# Patient Record
Sex: FEMALE | Race: BLACK OR AFRICAN AMERICAN | NOT HISPANIC OR LATINO | Employment: FULL TIME | RURAL
[De-identification: names, ages, dates, MRNs, and addresses within clinical notes are randomized per-mention and may not be internally consistent; named-entity substitution may affect disease eponyms.]

---

## 2019-12-09 ENCOUNTER — HISTORICAL (OUTPATIENT)
Dept: ADMINISTRATIVE | Facility: HOSPITAL | Age: 39
End: 2019-12-09

## 2019-12-11 LAB
LAB AP CLINICAL INFORMATION: NORMAL
LAB AP GENERAL CAT - HISTORICAL: NORMAL
LAB AP INTERPRETATION/RESULT - HISTORICAL: NEGATIVE
LAB AP SPECIMEN ADEQUACY - HISTORICAL: NORMAL
LAB AP SPECIMEN SUBMITTED - HISTORICAL: NORMAL

## 2020-05-04 ENCOUNTER — HISTORICAL (OUTPATIENT)
Dept: ADMINISTRATIVE | Facility: HOSPITAL | Age: 40
End: 2020-05-04

## 2020-05-04 LAB
BASOPHILS # BLD AUTO: 0.03 X10E3/UL (ref 0–0.2)
BASOPHILS NFR BLD AUTO: 0.3 % (ref 0–1)
EOSINOPHIL # BLD AUTO: 0.04 X10E3/UL (ref 0–0.5)
EOSINOPHIL NFR BLD AUTO: 0.4 % (ref 1–4)
ERYTHROCYTE [DISTWIDTH] IN BLOOD BY AUTOMATED COUNT: 13 % (ref 11.5–14.5)
GLUCOSE SERPL-MCNC: 124 MG/DL (ref 74–106)
HCT VFR BLD AUTO: 33.5 % (ref 38–47)
HGB BLD-MCNC: 10.9 G/DL (ref 12–16)
IMM GRANULOCYTES # BLD AUTO: 0.07 X10E3/UL (ref 0–0.04)
IMM GRANULOCYTES NFR BLD: 0.7 % (ref 0–0.4)
LYMPHOCYTES # BLD AUTO: 1.52 X10E3/UL (ref 1–4.8)
LYMPHOCYTES NFR BLD AUTO: 14.3 % (ref 27–41)
MCH RBC QN AUTO: 28 PG (ref 27–31)
MCHC RBC AUTO-ENTMCNC: 32.5 G/DL (ref 32–36)
MCV RBC AUTO: 86.1 FL (ref 80–96)
MONOCYTES # BLD AUTO: 0.72 X10E3/UL (ref 0–0.8)
MONOCYTES NFR BLD AUTO: 6.8 % (ref 2–6)
MPC BLD CALC-MCNC: 10.2 FL (ref 9.4–12.4)
NEUTROPHILS # BLD AUTO: 8.27 X10E3/UL (ref 1.8–7.7)
NEUTROPHILS NFR BLD AUTO: 77.5 % (ref 53–65)
NRBC # BLD AUTO: 0 X10E3/UL (ref 0–0)
NRBC, AUTO (.00): 0 /100 (ref 0–0)
PLATELET # BLD AUTO: 310 X10E3/UL (ref 150–400)
RBC # BLD AUTO: 3.89 X10E6/UL (ref 4.2–5.4)
WBC # BLD AUTO: 10.65 X10E3/UL (ref 4.5–11)

## 2020-06-29 ENCOUNTER — HISTORICAL (OUTPATIENT)
Dept: ADMINISTRATIVE | Facility: HOSPITAL | Age: 40
End: 2020-06-29

## 2020-06-29 LAB
BASOPHILS # BLD AUTO: 0.03 X10E3/UL (ref 0–0.2)
BASOPHILS NFR BLD AUTO: 0.3 % (ref 0–1)
CANDIDA SPECIES: NEGATIVE
EOSINOPHIL # BLD AUTO: 0.04 X10E3/UL (ref 0–0.5)
EOSINOPHIL NFR BLD AUTO: 0.5 % (ref 1–4)
ERYTHROCYTE [DISTWIDTH] IN BLOOD BY AUTOMATED COUNT: 13.2 % (ref 11.5–14.5)
GARDNERELLA: NEGATIVE
HCT VFR BLD AUTO: 33.4 % (ref 38–47)
HGB BLD-MCNC: 11.1 G/DL (ref 12–16)
IMM GRANULOCYTES # BLD AUTO: 0.05 X10E3/UL (ref 0–0.04)
IMM GRANULOCYTES NFR BLD: 0.6 % (ref 0–0.4)
LYMPHOCYTES # BLD AUTO: 1.61 X10E3/UL (ref 1–4.8)
LYMPHOCYTES NFR BLD AUTO: 18.6 % (ref 27–41)
MCH RBC QN AUTO: 28.1 PG (ref 27–31)
MCHC RBC AUTO-ENTMCNC: 33.2 G/DL (ref 32–36)
MCV RBC AUTO: 84.6 FL (ref 80–96)
MONOCYTES # BLD AUTO: 0.68 X10E3/UL (ref 0–0.8)
MONOCYTES NFR BLD AUTO: 7.9 % (ref 2–6)
MPC BLD CALC-MCNC: 10.2 FL (ref 9.4–12.4)
NEUTROPHILS # BLD AUTO: 6.25 X10E3/UL (ref 1.8–7.7)
NEUTROPHILS NFR BLD AUTO: 72.1 % (ref 53–65)
NRBC # BLD AUTO: 0 X10E3/UL (ref 0–0)
NRBC, AUTO (.00): 0 /100 (ref 0–0)
PLATELET # BLD AUTO: 259 X10E3/UL (ref 150–400)
RBC # BLD AUTO: 3.95 X10E6/UL (ref 4.2–5.4)
SYPHILIS AB INTERPRETATION: NORMAL
TRICHOMONAS: NEGATIVE
WBC # BLD AUTO: 8.66 X10E3/UL (ref 4.5–11)

## 2020-06-30 LAB
CHLAMYDIA BY PCR: NEGATIVE
GROUP B STREP, PCR: POSITIVE
N. GONORRHOEAE (GC) BY PCR: NEGATIVE

## 2020-07-12 ENCOUNTER — HISTORICAL (OUTPATIENT)
Dept: ADMINISTRATIVE | Facility: HOSPITAL | Age: 40
End: 2020-07-12

## 2020-07-12 LAB
BILIRUB UR QL STRIP: NEGATIVE MG/DL
CLARITY UR: CLEAR
COLOR UR: YELLOW
GLUCOSE UR STRIP-MCNC: NEGATIVE MG/DL
KETONES UR STRIP-SCNC: ABNORMAL MG/DL
LEUKOCYTE ESTERASE UR QL STRIP: NEGATIVE LEU/UL
NITRITE UR QL STRIP: NEGATIVE
PH UR STRIP: 6 PH UNITS (ref 5–8)
PROT UR QL STRIP: 100 MG/DL
RBC # UR STRIP: ABNORMAL ERY/UL
SP GR UR STRIP: >=1.03 (ref 1–1.03)
UROBILINOGEN UR STRIP-ACNC: 1 EU/DL

## 2020-07-13 LAB
ABO: NORMAL
ANTIBODY SCREEN: NORMAL
BACTERIA #/AREA URNS HPF: ABNORMAL /HPF
BASOPHILS # BLD AUTO: 0.02 X10E3/UL (ref 0–0.2)
BASOPHILS NFR BLD AUTO: 0.2 % (ref 0–1)
CLARITY UR: CLEAR
COLOR UR: YELLOW
EOSINOPHIL # BLD AUTO: 0.04 X10E3/UL (ref 0–0.5)
EOSINOPHIL NFR BLD AUTO: 0.4 % (ref 1–4)
ERYTHROCYTE [DISTWIDTH] IN BLOOD BY AUTOMATED COUNT: 13.1 % (ref 11.5–14.5)
HCT VFR BLD AUTO: 32.3 % (ref 38–47)
HGB BLD-MCNC: 10.6 G/DL (ref 12–16)
IMM GRANULOCYTES # BLD AUTO: 0.06 X10E3/UL (ref 0–0.04)
IMM GRANULOCYTES NFR BLD: 0.6 % (ref 0–0.4)
LYMPHOCYTES # BLD AUTO: 1.57 X10E3/UL (ref 1–4.8)
LYMPHOCYTES NFR BLD AUTO: 15.7 % (ref 27–41)
MCH RBC QN AUTO: 28 PG (ref 27–31)
MCHC RBC AUTO-ENTMCNC: 32.8 G/DL (ref 32–36)
MCV RBC AUTO: 85.4 FL (ref 80–96)
MONOCYTES # BLD AUTO: 0.74 X10E3/UL (ref 0–0.8)
MONOCYTES NFR BLD AUTO: 7.4 % (ref 2–6)
MPC BLD CALC-MCNC: 9.8 FL (ref 9.4–12.4)
MUCOUS THREADS #/AREA URNS HPF: ABNORMAL /HPF
NEUTROPHILS # BLD AUTO: 7.6 X10E3/UL (ref 1.8–7.7)
NEUTROPHILS NFR BLD AUTO: 75.7 % (ref 53–65)
NRBC # BLD AUTO: 0 X10E3/UL (ref 0–0)
NRBC, AUTO (.00): 0 /100 (ref 0–0)
PLATELET # BLD AUTO: 244 X10E3/UL (ref 150–400)
RBC # BLD AUTO: 3.78 X10E6/UL (ref 4.2–5.4)
RBC #/AREA URNS HPF: ABNORMAL /HPF (ref 0–3)
RH TYPE: NORMAL
SQUAMOUS #/AREA URNS LPF: ABNORMAL /LPF
SYPHILIS AB INTERPRETATION: NORMAL
TRICHOMONAS #/AREA URNS HPF: ABNORMAL /HPF
WBC # BLD AUTO: 10.03 X10E3/UL (ref 4.5–11)
WBC #/AREA URNS HPF: ABNORMAL /HPF (ref 0–5)
YEAST #/AREA URNS HPF: ABNORMAL /HPF

## 2020-07-14 ENCOUNTER — HISTORICAL (OUTPATIENT)
Dept: ADMINISTRATIVE | Facility: HOSPITAL | Age: 40
End: 2020-07-14

## 2020-07-14 LAB
BASOPHILS # BLD AUTO: 0.04 X10E3/UL (ref 0–0.2)
BASOPHILS NFR BLD AUTO: 0.3 % (ref 0–1)
EOSINOPHIL # BLD AUTO: 0.07 X10E3/UL (ref 0–0.5)
EOSINOPHIL NFR BLD AUTO: 0.5 % (ref 1–4)
ERYTHROCYTE [DISTWIDTH] IN BLOOD BY AUTOMATED COUNT: 13.3 % (ref 11.5–14.5)
HCT VFR BLD AUTO: 25.4 % (ref 38–47)
HGB BLD-MCNC: 8.3 G/DL (ref 12–16)
IMM GRANULOCYTES # BLD AUTO: 0.11 X10E3/UL (ref 0–0.04)
IMM GRANULOCYTES NFR BLD: 0.7 % (ref 0–0.4)
LYMPHOCYTES # BLD AUTO: 1.88 X10E3/UL (ref 1–4.8)
LYMPHOCYTES NFR BLD AUTO: 12.2 % (ref 27–41)
MCH RBC QN AUTO: 28.1 PG (ref 27–31)
MCHC RBC AUTO-ENTMCNC: 32.7 G/DL (ref 32–36)
MCV RBC AUTO: 86.1 FL (ref 80–96)
MONOCYTES # BLD AUTO: 1.08 X10E3/UL (ref 0–0.8)
MONOCYTES NFR BLD AUTO: 7 % (ref 2–6)
MPC BLD CALC-MCNC: 9.6 FL (ref 9.4–12.4)
NEUTROPHILS # BLD AUTO: 12.19 X10E3/UL (ref 1.8–7.7)
NEUTROPHILS NFR BLD AUTO: 79.3 % (ref 53–65)
NRBC # BLD AUTO: 0 X10E3/UL (ref 0–0)
NRBC, AUTO (.00): 0 /100 (ref 0–0)
PLATELET # BLD AUTO: 202 X10E3/UL (ref 150–400)
RBC # BLD AUTO: 2.95 X10E6/UL (ref 4.2–5.4)
WBC # BLD AUTO: 15.37 X10E3/UL (ref 4.5–11)

## 2020-09-01 ENCOUNTER — HISTORICAL (OUTPATIENT)
Dept: ADMINISTRATIVE | Facility: HOSPITAL | Age: 40
End: 2020-09-01

## 2020-09-03 LAB
LAB AP DIAGNOSIS - HISTORICAL: NORMAL
LAB AP GROSS PATHOLOGY - HISTORICAL: NORMAL
LAB AP SPECIMEN SUBMITTED - HISTORICAL: NORMAL

## 2021-02-22 ENCOUNTER — HISTORICAL (OUTPATIENT)
Dept: ADMINISTRATIVE | Facility: HOSPITAL | Age: 41
End: 2021-02-22

## 2021-03-02 LAB
INSULIN SERPL-ACNC: NORMAL U[IU]/ML

## 2021-07-15 ENCOUNTER — OFFICE VISIT (OUTPATIENT)
Dept: FAMILY MEDICINE | Facility: CLINIC | Age: 41
End: 2021-07-15
Payer: COMMERCIAL

## 2021-07-15 VITALS
RESPIRATION RATE: 20 BRPM | HEART RATE: 68 BPM | HEIGHT: 64 IN | DIASTOLIC BLOOD PRESSURE: 92 MMHG | TEMPERATURE: 98 F | OXYGEN SATURATION: 100 % | SYSTOLIC BLOOD PRESSURE: 140 MMHG

## 2021-07-15 DIAGNOSIS — H66.90 OTITIS MEDIA, UNSPECIFIED LATERALITY, UNSPECIFIED OTITIS MEDIA TYPE: ICD-10-CM

## 2021-07-15 DIAGNOSIS — Z20.828 EXPOSURE TO SARS-ASSOCIATED CORONAVIRUS: Primary | ICD-10-CM

## 2021-07-15 DIAGNOSIS — J01.00 ACUTE NON-RECURRENT MAXILLARY SINUSITIS: ICD-10-CM

## 2021-07-15 LAB
CTP QC/QA: YES
FLUAV AG NPH QL: NEGATIVE
FLUBV AG NPH QL: NEGATIVE
SARS-COV-2 AG RESP QL IA.RAPID: NEGATIVE

## 2021-07-15 PROCEDURE — 96372 THER/PROPH/DIAG INJ SC/IM: CPT | Mod: ,,, | Performed by: NURSE PRACTITIONER

## 2021-07-15 PROCEDURE — 87428 POCT SARS-COV2 (COVID) WITH FLU ANTIGEN: ICD-10-PCS | Mod: QW,,, | Performed by: NURSE PRACTITIONER

## 2021-07-15 PROCEDURE — 99213 OFFICE O/P EST LOW 20 MIN: CPT | Mod: 25,,, | Performed by: NURSE PRACTITIONER

## 2021-07-15 PROCEDURE — 99213 PR OFFICE/OUTPT VISIT, EST, LEVL III, 20-29 MIN: ICD-10-PCS | Mod: 25,,, | Performed by: NURSE PRACTITIONER

## 2021-07-15 PROCEDURE — 87428 SARSCOV & INF VIR A&B AG IA: CPT | Mod: QW,,, | Performed by: NURSE PRACTITIONER

## 2021-07-15 PROCEDURE — 96372 PR INJECTION,THERAP/PROPH/DIAG2ST, IM OR SUBCUT: ICD-10-PCS | Mod: ,,, | Performed by: NURSE PRACTITIONER

## 2021-07-15 RX ORDER — DEXAMETHASONE SODIUM PHOSPHATE 4 MG/ML
6 INJECTION, SOLUTION INTRA-ARTICULAR; INTRALESIONAL; INTRAMUSCULAR; INTRAVENOUS; SOFT TISSUE ONCE
Status: COMPLETED | OUTPATIENT
Start: 2021-07-15 | End: 2021-07-15

## 2021-07-15 RX ORDER — AMLODIPINE BESYLATE 5 MG/1
5 TABLET ORAL DAILY
COMMUNITY
Start: 2021-06-04 | End: 2021-10-11 | Stop reason: SDUPTHER

## 2021-07-15 RX ORDER — LABETALOL 300 MG/1
300 TABLET, FILM COATED ORAL 2 TIMES DAILY
COMMUNITY
Start: 2021-05-23 | End: 2021-09-10 | Stop reason: ALTCHOICE

## 2021-07-15 RX ORDER — AMOXICILLIN AND CLAVULANATE POTASSIUM 875; 125 MG/1; MG/1
1 TABLET, FILM COATED ORAL 2 TIMES DAILY
Qty: 20 TABLET | Refills: 0 | Status: SHIPPED | OUTPATIENT
Start: 2021-07-15 | End: 2021-07-25

## 2021-07-15 RX ORDER — ACETAMINOPHEN AND CODEINE PHOSPHATE 120; 12 MG/5ML; MG/5ML
1 SOLUTION ORAL DAILY
COMMUNITY
Start: 2021-01-25 | End: 2022-03-01 | Stop reason: ALTCHOICE

## 2021-07-15 RX ORDER — VENLAFAXINE HYDROCHLORIDE 37.5 MG/1
37.5 CAPSULE, EXTENDED RELEASE ORAL DAILY
COMMUNITY
Start: 2021-06-04 | End: 2023-04-18

## 2021-07-15 RX ORDER — MECLIZINE HCL 12.5 MG 12.5 MG/1
12.5 TABLET ORAL 3 TIMES DAILY PRN
Qty: 30 TABLET | Refills: 0 | Status: SHIPPED | OUTPATIENT
Start: 2021-07-15 | End: 2021-11-05

## 2021-07-15 RX ADMIN — DEXAMETHASONE SODIUM PHOSPHATE 6 MG: 4 INJECTION, SOLUTION INTRA-ARTICULAR; INTRALESIONAL; INTRAMUSCULAR; INTRAVENOUS; SOFT TISSUE at 12:07

## 2021-07-29 ENCOUNTER — HOSPITAL ENCOUNTER (OUTPATIENT)
Dept: RADIOLOGY | Facility: HOSPITAL | Age: 41
Discharge: HOME | End: 2021-07-29
Attending: NURSE PRACTITIONER
Payer: COMMERCIAL

## 2021-07-29 ENCOUNTER — OFFICE VISIT (OUTPATIENT)
Dept: PRIMARY CARE CLINIC | Facility: CLINIC | Age: 41
End: 2021-07-29
Payer: COMMERCIAL

## 2021-07-29 VITALS
RESPIRATION RATE: 18 BRPM | BODY MASS INDEX: 31.07 KG/M2 | DIASTOLIC BLOOD PRESSURE: 69 MMHG | WEIGHT: 182 LBS | HEIGHT: 64 IN | SYSTOLIC BLOOD PRESSURE: 138 MMHG | TEMPERATURE: 99 F | HEART RATE: 104 BPM | OXYGEN SATURATION: 97 %

## 2021-07-29 DIAGNOSIS — S80.02XA CONTUSION OF LEFT KNEE, INITIAL ENCOUNTER: ICD-10-CM

## 2021-07-29 DIAGNOSIS — S40.012A CONTUSION OF LEFT SHOULDER, INITIAL ENCOUNTER: ICD-10-CM

## 2021-07-29 DIAGNOSIS — S80.02XA CONTUSION OF LEFT KNEE, INITIAL ENCOUNTER: Primary | ICD-10-CM

## 2021-07-29 PROCEDURE — 73562 X-RAY EXAM OF KNEE 3: CPT | Mod: TC,LT

## 2021-07-29 PROCEDURE — 99213 OFFICE O/P EST LOW 20 MIN: CPT | Mod: ,,, | Performed by: NURSE PRACTITIONER

## 2021-07-29 PROCEDURE — 73562 XR KNEE AP LAT WITH SUNRISE LEFT: ICD-10-PCS | Mod: 26,LT,, | Performed by: RADIOLOGY

## 2021-07-29 PROCEDURE — 73030 X-RAY EXAM OF SHOULDER: CPT | Mod: 26,LT,, | Performed by: RADIOLOGY

## 2021-07-29 PROCEDURE — 99213 PR OFFICE/OUTPT VISIT, EST, LEVL III, 20-29 MIN: ICD-10-PCS | Mod: ,,, | Performed by: NURSE PRACTITIONER

## 2021-07-29 PROCEDURE — 73030 X-RAY EXAM OF SHOULDER: CPT | Mod: TC,LT

## 2021-07-29 PROCEDURE — 73562 X-RAY EXAM OF KNEE 3: CPT | Mod: 26,LT,, | Performed by: RADIOLOGY

## 2021-07-29 PROCEDURE — 73030 XR SHOULDER COMPLETE 2 OR MORE VIEWS LEFT: ICD-10-PCS | Mod: 26,LT,, | Performed by: RADIOLOGY

## 2021-08-03 ENCOUNTER — OFFICE VISIT (OUTPATIENT)
Dept: FAMILY MEDICINE | Facility: CLINIC | Age: 41
End: 2021-08-03
Payer: COMMERCIAL

## 2021-08-03 DIAGNOSIS — Z11.52 ENCOUNTER FOR SCREENING FOR COVID-19: Primary | ICD-10-CM

## 2021-08-03 PROCEDURE — 99212 PR OFFICE/OUTPT VISIT, EST, LEVL II, 10-19 MIN: ICD-10-PCS | Mod: GC,,, | Performed by: FAMILY MEDICINE

## 2021-08-03 PROCEDURE — 87635 SARS-COV-2 COVID-19 AMP PRB: CPT | Mod: ,,, | Performed by: CLINICAL MEDICAL LABORATORY

## 2021-08-03 PROCEDURE — 99212 OFFICE O/P EST SF 10 MIN: CPT | Mod: GC,,, | Performed by: FAMILY MEDICINE

## 2021-08-03 PROCEDURE — 87635 SARS-COV-2 (COVID-19) QUALITATIVE PCR: ICD-10-PCS | Mod: ,,, | Performed by: CLINICAL MEDICAL LABORATORY

## 2021-08-05 LAB — SARS-COV-2 RNA RESP QL NAA+PROBE: NEGATIVE

## 2021-09-10 ENCOUNTER — OFFICE VISIT (OUTPATIENT)
Dept: FAMILY MEDICINE | Facility: CLINIC | Age: 41
End: 2021-09-10
Payer: COMMERCIAL

## 2021-09-10 VITALS
HEART RATE: 76 BPM | SYSTOLIC BLOOD PRESSURE: 140 MMHG | TEMPERATURE: 97 F | RESPIRATION RATE: 16 BRPM | BODY MASS INDEX: 31.24 KG/M2 | DIASTOLIC BLOOD PRESSURE: 80 MMHG | HEIGHT: 64 IN | WEIGHT: 183 LBS

## 2021-09-10 DIAGNOSIS — I10 ESSENTIAL HYPERTENSION: ICD-10-CM

## 2021-09-10 PROCEDURE — 3077F SYST BP >= 140 MM HG: CPT | Mod: ,,, | Performed by: FAMILY MEDICINE

## 2021-09-10 PROCEDURE — 99213 PR OFFICE/OUTPT VISIT, EST, LEVL III, 20-29 MIN: ICD-10-PCS | Mod: ,,, | Performed by: FAMILY MEDICINE

## 2021-09-10 PROCEDURE — 1159F MED LIST DOCD IN RCRD: CPT | Mod: ,,, | Performed by: FAMILY MEDICINE

## 2021-09-10 PROCEDURE — 3077F PR MOST RECENT SYSTOLIC BLOOD PRESSURE >= 140 MM HG: ICD-10-PCS | Mod: ,,, | Performed by: FAMILY MEDICINE

## 2021-09-10 PROCEDURE — 3008F BODY MASS INDEX DOCD: CPT | Mod: ,,, | Performed by: FAMILY MEDICINE

## 2021-09-10 PROCEDURE — 3079F DIAST BP 80-89 MM HG: CPT | Mod: ,,, | Performed by: FAMILY MEDICINE

## 2021-09-10 PROCEDURE — 1159F PR MEDICATION LIST DOCUMENTED IN MEDICAL RECORD: ICD-10-PCS | Mod: ,,, | Performed by: FAMILY MEDICINE

## 2021-09-10 PROCEDURE — 3008F PR BODY MASS INDEX (BMI) DOCUMENTED: ICD-10-PCS | Mod: ,,, | Performed by: FAMILY MEDICINE

## 2021-09-10 PROCEDURE — 99213 OFFICE O/P EST LOW 20 MIN: CPT | Mod: ,,, | Performed by: FAMILY MEDICINE

## 2021-09-10 PROCEDURE — 3079F PR MOST RECENT DIASTOLIC BLOOD PRESSURE 80-89 MM HG: ICD-10-PCS | Mod: ,,, | Performed by: FAMILY MEDICINE

## 2021-10-11 ENCOUNTER — OFFICE VISIT (OUTPATIENT)
Dept: FAMILY MEDICINE | Facility: CLINIC | Age: 41
End: 2021-10-11
Payer: COMMERCIAL

## 2021-10-11 VITALS
BODY MASS INDEX: 31.76 KG/M2 | SYSTOLIC BLOOD PRESSURE: 140 MMHG | DIASTOLIC BLOOD PRESSURE: 92 MMHG | RESPIRATION RATE: 16 BRPM | HEART RATE: 88 BPM | WEIGHT: 186 LBS | HEIGHT: 64 IN | TEMPERATURE: 97 F

## 2021-10-11 DIAGNOSIS — I10 ESSENTIAL HYPERTENSION: Primary | ICD-10-CM

## 2021-10-11 DIAGNOSIS — R41.0 CONFUSION: ICD-10-CM

## 2021-10-11 LAB
ALBUMIN SERPL BCP-MCNC: 3.8 G/DL (ref 3.5–5)
ALBUMIN/GLOB SERPL: 1.1 {RATIO}
ALP SERPL-CCNC: 63 U/L (ref 37–98)
ALT SERPL W P-5'-P-CCNC: 18 U/L (ref 13–56)
ANION GAP SERPL CALCULATED.3IONS-SCNC: 9 MMOL/L (ref 7–16)
AST SERPL W P-5'-P-CCNC: 15 U/L (ref 15–37)
BASOPHILS # BLD AUTO: 0.03 K/UL (ref 0–0.2)
BASOPHILS NFR BLD AUTO: 0.4 % (ref 0–1)
BILIRUB SERPL-MCNC: 1.1 MG/DL (ref 0–1.2)
BUN SERPL-MCNC: 15 MG/DL (ref 7–18)
BUN/CREAT SERPL: 15 (ref 6–20)
CALCIUM SERPL-MCNC: 9.4 MG/DL (ref 8.5–10.1)
CHLORIDE SERPL-SCNC: 107 MMOL/L (ref 98–107)
CO2 SERPL-SCNC: 28 MMOL/L (ref 21–32)
CREAT SERPL-MCNC: 0.98 MG/DL (ref 0.55–1.02)
DIFFERENTIAL METHOD BLD: ABNORMAL
EOSINOPHIL # BLD AUTO: 0.09 K/UL (ref 0–0.5)
EOSINOPHIL NFR BLD AUTO: 1.3 % (ref 1–4)
ERYTHROCYTE [DISTWIDTH] IN BLOOD BY AUTOMATED COUNT: 12.8 % (ref 11.5–14.5)
GLOBULIN SER-MCNC: 3.4 G/DL (ref 2–4)
GLUCOSE SERPL-MCNC: 106 MG/DL (ref 74–106)
HCT VFR BLD AUTO: 42.7 % (ref 38–47)
HGB BLD-MCNC: 14 G/DL (ref 12–16)
IMM GRANULOCYTES # BLD AUTO: 0.03 K/UL (ref 0–0.04)
IMM GRANULOCYTES NFR BLD: 0.4 % (ref 0–0.4)
IRON SATN MFR SERPL: 39 % (ref 14–50)
IRON SERPL-MCNC: 114 ΜG/DL (ref 50–170)
LYMPHOCYTES # BLD AUTO: 1.98 K/UL (ref 1–4.8)
LYMPHOCYTES NFR BLD AUTO: 27.5 % (ref 27–41)
MCH RBC QN AUTO: 27.3 PG (ref 27–31)
MCHC RBC AUTO-ENTMCNC: 32.8 G/DL (ref 32–36)
MCV RBC AUTO: 83.4 FL (ref 80–96)
MONOCYTES # BLD AUTO: 0.71 K/UL (ref 0–0.8)
MONOCYTES NFR BLD AUTO: 9.9 % (ref 2–6)
MPC BLD CALC-MCNC: 9.7 FL (ref 9.4–12.4)
NEUTROPHILS # BLD AUTO: 4.35 K/UL (ref 1.8–7.7)
NEUTROPHILS NFR BLD AUTO: 60.5 % (ref 53–65)
NRBC # BLD AUTO: 0 X10E3/UL
NRBC, AUTO (.00): 0 %
PLATELET # BLD AUTO: 378 K/UL (ref 150–400)
POTASSIUM SERPL-SCNC: 4.1 MMOL/L (ref 3.5–5.1)
PROT SERPL-MCNC: 7.2 G/DL (ref 6.4–8.2)
RBC # BLD AUTO: 5.12 M/UL (ref 4.2–5.4)
SODIUM SERPL-SCNC: 140 MMOL/L (ref 136–145)
T4 FREE SERPL-MCNC: 1.15 NG/DL (ref 0.76–1.46)
TIBC SERPL-MCNC: 291 ΜG/DL (ref 250–450)
TSH SERPL DL<=0.005 MIU/L-ACNC: 0.45 UIU/ML (ref 0.36–3.74)
WBC # BLD AUTO: 7.19 K/UL (ref 4.5–11)

## 2021-10-11 PROCEDURE — 3080F PR MOST RECENT DIASTOLIC BLOOD PRESSURE >= 90 MM HG: ICD-10-PCS | Mod: ,,, | Performed by: FAMILY MEDICINE

## 2021-10-11 PROCEDURE — 80050 PR  GENERAL HEALTH PANEL: ICD-10-PCS | Mod: ,,, | Performed by: CLINICAL MEDICAL LABORATORY

## 2021-10-11 PROCEDURE — 1159F MED LIST DOCD IN RCRD: CPT | Mod: ,,, | Performed by: FAMILY MEDICINE

## 2021-10-11 PROCEDURE — 3080F DIAST BP >= 90 MM HG: CPT | Mod: ,,, | Performed by: FAMILY MEDICINE

## 2021-10-11 PROCEDURE — 99214 OFFICE O/P EST MOD 30 MIN: CPT | Mod: ,,, | Performed by: FAMILY MEDICINE

## 2021-10-11 PROCEDURE — 99214 PR OFFICE/OUTPT VISIT, EST, LEVL IV, 30-39 MIN: ICD-10-PCS | Mod: ,,, | Performed by: FAMILY MEDICINE

## 2021-10-11 PROCEDURE — 3077F SYST BP >= 140 MM HG: CPT | Mod: ,,, | Performed by: FAMILY MEDICINE

## 2021-10-11 PROCEDURE — 83550 IRON BINDING TEST: CPT | Mod: ,,, | Performed by: CLINICAL MEDICAL LABORATORY

## 2021-10-11 PROCEDURE — 1159F PR MEDICATION LIST DOCUMENTED IN MEDICAL RECORD: ICD-10-PCS | Mod: ,,, | Performed by: FAMILY MEDICINE

## 2021-10-11 PROCEDURE — 83550 IRON AND TIBC: ICD-10-PCS | Mod: ,,, | Performed by: CLINICAL MEDICAL LABORATORY

## 2021-10-11 PROCEDURE — 1160F RVW MEDS BY RX/DR IN RCRD: CPT | Mod: ,,, | Performed by: FAMILY MEDICINE

## 2021-10-11 PROCEDURE — 1160F PR REVIEW ALL MEDS BY PRESCRIBER/CLIN PHARMACIST DOCUMENTED: ICD-10-PCS | Mod: ,,, | Performed by: FAMILY MEDICINE

## 2021-10-11 PROCEDURE — 84439 ASSAY OF FREE THYROXINE: CPT | Mod: ,,, | Performed by: CLINICAL MEDICAL LABORATORY

## 2021-10-11 PROCEDURE — 83540 IRON AND TIBC: ICD-10-PCS | Mod: ,,, | Performed by: CLINICAL MEDICAL LABORATORY

## 2021-10-11 PROCEDURE — 80050 GENERAL HEALTH PANEL: CPT | Mod: ,,, | Performed by: CLINICAL MEDICAL LABORATORY

## 2021-10-11 PROCEDURE — 83540 ASSAY OF IRON: CPT | Mod: ,,, | Performed by: CLINICAL MEDICAL LABORATORY

## 2021-10-11 PROCEDURE — 3008F BODY MASS INDEX DOCD: CPT | Mod: ,,, | Performed by: FAMILY MEDICINE

## 2021-10-11 PROCEDURE — 3077F PR MOST RECENT SYSTOLIC BLOOD PRESSURE >= 140 MM HG: ICD-10-PCS | Mod: ,,, | Performed by: FAMILY MEDICINE

## 2021-10-11 PROCEDURE — 84439 THYROID PANEL: ICD-10-PCS | Mod: ,,, | Performed by: CLINICAL MEDICAL LABORATORY

## 2021-10-11 PROCEDURE — 3008F PR BODY MASS INDEX (BMI) DOCUMENTED: ICD-10-PCS | Mod: ,,, | Performed by: FAMILY MEDICINE

## 2021-10-11 RX ORDER — AMLODIPINE BESYLATE 5 MG/1
5 TABLET ORAL DAILY
Qty: 90 TABLET | Refills: 3 | Status: SHIPPED | OUTPATIENT
Start: 2021-10-11 | End: 2022-06-30 | Stop reason: DRUGHIGH

## 2021-10-18 PROBLEM — J01.00 ACUTE NON-RECURRENT MAXILLARY SINUSITIS: Status: RESOLVED | Noted: 2021-07-15 | Resolved: 2021-10-18

## 2021-11-05 ENCOUNTER — OFFICE VISIT (OUTPATIENT)
Dept: FAMILY MEDICINE | Facility: CLINIC | Age: 41
End: 2021-11-05
Payer: COMMERCIAL

## 2021-11-05 VITALS
TEMPERATURE: 98 F | SYSTOLIC BLOOD PRESSURE: 138 MMHG | HEIGHT: 64 IN | HEART RATE: 98 BPM | BODY MASS INDEX: 31.82 KG/M2 | WEIGHT: 186.38 LBS | OXYGEN SATURATION: 97 % | DIASTOLIC BLOOD PRESSURE: 90 MMHG

## 2021-11-05 DIAGNOSIS — R51.9 CHRONIC NONINTRACTABLE HEADACHE, UNSPECIFIED HEADACHE TYPE: Primary | ICD-10-CM

## 2021-11-05 DIAGNOSIS — R42 VERTIGO: ICD-10-CM

## 2021-11-05 DIAGNOSIS — G89.29 CHRONIC NONINTRACTABLE HEADACHE, UNSPECIFIED HEADACHE TYPE: Primary | ICD-10-CM

## 2021-11-05 DIAGNOSIS — L30.9 ECZEMA, UNSPECIFIED TYPE: ICD-10-CM

## 2021-11-05 PROCEDURE — 3008F PR BODY MASS INDEX (BMI) DOCUMENTED: ICD-10-PCS | Mod: ,,, | Performed by: NURSE PRACTITIONER

## 2021-11-05 PROCEDURE — 3075F SYST BP GE 130 - 139MM HG: CPT | Mod: ,,, | Performed by: NURSE PRACTITIONER

## 2021-11-05 PROCEDURE — 99213 OFFICE O/P EST LOW 20 MIN: CPT | Mod: ,,, | Performed by: NURSE PRACTITIONER

## 2021-11-05 PROCEDURE — 3080F PR MOST RECENT DIASTOLIC BLOOD PRESSURE >= 90 MM HG: ICD-10-PCS | Mod: ,,, | Performed by: NURSE PRACTITIONER

## 2021-11-05 PROCEDURE — 3075F PR MOST RECENT SYSTOLIC BLOOD PRESS GE 130-139MM HG: ICD-10-PCS | Mod: ,,, | Performed by: NURSE PRACTITIONER

## 2021-11-05 PROCEDURE — 3008F BODY MASS INDEX DOCD: CPT | Mod: ,,, | Performed by: NURSE PRACTITIONER

## 2021-11-05 PROCEDURE — 1160F PR REVIEW ALL MEDS BY PRESCRIBER/CLIN PHARMACIST DOCUMENTED: ICD-10-PCS | Mod: ,,, | Performed by: NURSE PRACTITIONER

## 2021-11-05 PROCEDURE — 99213 PR OFFICE/OUTPT VISIT, EST, LEVL III, 20-29 MIN: ICD-10-PCS | Mod: ,,, | Performed by: NURSE PRACTITIONER

## 2021-11-05 PROCEDURE — 1160F RVW MEDS BY RX/DR IN RCRD: CPT | Mod: ,,, | Performed by: NURSE PRACTITIONER

## 2021-11-05 PROCEDURE — 1159F MED LIST DOCD IN RCRD: CPT | Mod: ,,, | Performed by: NURSE PRACTITIONER

## 2021-11-05 PROCEDURE — 1159F PR MEDICATION LIST DOCUMENTED IN MEDICAL RECORD: ICD-10-PCS | Mod: ,,, | Performed by: NURSE PRACTITIONER

## 2021-11-05 PROCEDURE — 3080F DIAST BP >= 90 MM HG: CPT | Mod: ,,, | Performed by: NURSE PRACTITIONER

## 2021-11-05 RX ORDER — PREDNISONE 10 MG/1
10 TABLET ORAL DAILY
Qty: 7 TABLET | Refills: 0 | Status: SHIPPED | OUTPATIENT
Start: 2021-11-05 | End: 2021-11-12

## 2021-11-05 RX ORDER — TRIAMCINOLONE ACETONIDE 1 MG/G
CREAM TOPICAL 2 TIMES DAILY
Qty: 453.6 G | Refills: 5 | Status: SHIPPED | OUTPATIENT
Start: 2021-11-05 | End: 2023-03-07

## 2021-11-05 RX ORDER — MECLIZINE HYDROCHLORIDE 25 MG/1
25 TABLET ORAL 3 TIMES DAILY PRN
Qty: 60 TABLET | Refills: 0 | Status: SHIPPED | OUTPATIENT
Start: 2021-11-05 | End: 2023-03-07

## 2021-11-15 ENCOUNTER — HOSPITAL ENCOUNTER (OUTPATIENT)
Dept: RADIOLOGY | Facility: HOSPITAL | Age: 41
Discharge: HOME | End: 2021-11-15
Attending: NURSE PRACTITIONER
Payer: COMMERCIAL

## 2021-11-15 DIAGNOSIS — R51.9 CHRONIC NONINTRACTABLE HEADACHE, UNSPECIFIED HEADACHE TYPE: ICD-10-CM

## 2021-11-15 DIAGNOSIS — R42 VERTIGO: ICD-10-CM

## 2021-11-15 DIAGNOSIS — G89.29 CHRONIC NONINTRACTABLE HEADACHE, UNSPECIFIED HEADACHE TYPE: ICD-10-CM

## 2021-11-15 PROCEDURE — 70450 CT HEAD/BRAIN W/O DYE: CPT | Mod: TC

## 2021-11-15 PROCEDURE — 70450 CT HEAD WITHOUT CONTRAST: ICD-10-PCS | Mod: 26,,, | Performed by: RADIOLOGY

## 2021-11-15 PROCEDURE — 70450 CT HEAD/BRAIN W/O DYE: CPT | Mod: 26,,, | Performed by: RADIOLOGY

## 2022-03-01 ENCOUNTER — OFFICE VISIT (OUTPATIENT)
Dept: OBSTETRICS AND GYNECOLOGY | Facility: CLINIC | Age: 42
End: 2022-03-01
Payer: COMMERCIAL

## 2022-03-01 ENCOUNTER — HOSPITAL ENCOUNTER (OUTPATIENT)
Dept: RADIOLOGY | Facility: HOSPITAL | Age: 42
Discharge: HOME | End: 2022-03-01
Attending: OBSTETRICS & GYNECOLOGY
Payer: COMMERCIAL

## 2022-03-01 VITALS
DIASTOLIC BLOOD PRESSURE: 90 MMHG | HEIGHT: 64 IN | TEMPERATURE: 97 F | BODY MASS INDEX: 32.44 KG/M2 | HEART RATE: 77 BPM | WEIGHT: 190 LBS | SYSTOLIC BLOOD PRESSURE: 135 MMHG | RESPIRATION RATE: 18 BRPM

## 2022-03-01 DIAGNOSIS — G89.29 CHRONIC BILATERAL THORACIC BACK PAIN: ICD-10-CM

## 2022-03-01 DIAGNOSIS — Z12.4 SCREENING FOR MALIGNANT NEOPLASM OF THE CERVIX: ICD-10-CM

## 2022-03-01 DIAGNOSIS — Z01.419 ROUTINE GYNECOLOGICAL EXAMINATION: Primary | ICD-10-CM

## 2022-03-01 DIAGNOSIS — Z30.41 ENCOUNTER FOR SURVEILLANCE OF CONTRACEPTIVE PILLS: ICD-10-CM

## 2022-03-01 DIAGNOSIS — Z12.31 SCREENING MAMMOGRAM FOR HIGH-RISK PATIENT: ICD-10-CM

## 2022-03-01 DIAGNOSIS — M54.6 CHRONIC BILATERAL THORACIC BACK PAIN: ICD-10-CM

## 2022-03-01 DIAGNOSIS — N92.1 BREAKTHROUGH BLEEDING ON OCPS: ICD-10-CM

## 2022-03-01 PROCEDURE — 3008F PR BODY MASS INDEX (BMI) DOCUMENTED: ICD-10-PCS | Mod: ,,, | Performed by: OBSTETRICS & GYNECOLOGY

## 2022-03-01 PROCEDURE — 99396 PREV VISIT EST AGE 40-64: CPT | Mod: ,,, | Performed by: OBSTETRICS & GYNECOLOGY

## 2022-03-01 PROCEDURE — 87624 HPV HI-RISK TYP POOLED RSLT: CPT | Mod: ,,, | Performed by: CLINICAL MEDICAL LABORATORY

## 2022-03-01 PROCEDURE — 87624 HUMAN PAPILLOMAVIRUS (HPV): ICD-10-PCS | Mod: ,,, | Performed by: CLINICAL MEDICAL LABORATORY

## 2022-03-01 PROCEDURE — 1159F PR MEDICATION LIST DOCUMENTED IN MEDICAL RECORD: ICD-10-PCS | Mod: ,,, | Performed by: OBSTETRICS & GYNECOLOGY

## 2022-03-01 PROCEDURE — 3075F PR MOST RECENT SYSTOLIC BLOOD PRESS GE 130-139MM HG: ICD-10-PCS | Mod: ,,, | Performed by: OBSTETRICS & GYNECOLOGY

## 2022-03-01 PROCEDURE — 3075F SYST BP GE 130 - 139MM HG: CPT | Mod: ,,, | Performed by: OBSTETRICS & GYNECOLOGY

## 2022-03-01 PROCEDURE — 3008F BODY MASS INDEX DOCD: CPT | Mod: ,,, | Performed by: OBSTETRICS & GYNECOLOGY

## 2022-03-01 PROCEDURE — 1159F MED LIST DOCD IN RCRD: CPT | Mod: ,,, | Performed by: OBSTETRICS & GYNECOLOGY

## 2022-03-01 PROCEDURE — 99396 PR PREVENTIVE VISIT,EST,40-64: ICD-10-PCS | Mod: ,,, | Performed by: OBSTETRICS & GYNECOLOGY

## 2022-03-01 PROCEDURE — 3080F PR MOST RECENT DIASTOLIC BLOOD PRESSURE >= 90 MM HG: ICD-10-PCS | Mod: ,,, | Performed by: OBSTETRICS & GYNECOLOGY

## 2022-03-01 PROCEDURE — 3080F DIAST BP >= 90 MM HG: CPT | Mod: ,,, | Performed by: OBSTETRICS & GYNECOLOGY

## 2022-03-01 PROCEDURE — 77067 SCR MAMMO BI INCL CAD: CPT | Mod: TC

## 2022-03-01 RX ORDER — DROSPIRENONE 4 MG/1
4 TABLET, FILM COATED ORAL DAILY
Qty: 30 TABLET | Refills: 11 | Status: SHIPPED | OUTPATIENT
Start: 2022-03-01 | End: 2023-03-07 | Stop reason: SDUPTHER

## 2022-03-01 NOTE — PROGRESS NOTES
"CC: Well woman exam    Jw NELSON is a 41 y.o. female  presents for well woman exam.  LMP: No LMP recorded..  No issues, problems, or complaints. Pt states she has bleeding between cycles. Pt reports not taking her pill regularly/ Pt complains back pain with neck and shoulder pain that is accompanied by headaches.    Past Medical History:   Diagnosis Date    Essential (primary) hypertension      Past Surgical History:   Procedure Laterality Date    DILATION AND CURETTAGE OF UTERUS      Surgery done in [place]     Social History     Socioeconomic History    Marital status:    Tobacco Use    Smoking status: Never Smoker    Smokeless tobacco: Never Used     Family History   Problem Relation Age of Onset    Hypertension Mother     Hypertension Father     Diabetes Father     Hypertension Maternal Grandmother     Hypertension Maternal Grandfather     Diabetes Maternal Grandfather     Hypertension Paternal Grandmother     Hypertension Paternal Grandfather      OB History        2    Para        Term                AB   1    Living   1       SAB   1    IAB        Ectopic        Multiple        Live Births   1                 BP (!) 135/90 (BP Location: Right arm, Patient Position: Sitting)   Pulse 77   Temp 97.3 °F (36.3 °C)   Resp 18   Ht 5' 4" (1.626 m)   Wt 86.2 kg (190 lb)   BMI 32.61 kg/m²       ROS:  GENERAL: Denies weight gain or weight loss. Feeling well overall.   SKIN: Denies rash or lesions.   HEAD: Denies head injury or headache.   NODES: Denies enlarged lymph nodes.   CHEST: Denies chest pain or shortness of breath.   CARDIOVASCULAR: Denies palpitations or left sided chest pain.   ABDOMEN: No abdominal pain, constipation, diarrhea, nausea, vomiting or rectal bleeding.   URINARY: No frequency, dysuria, hematuria, or burning on urination.  REPRODUCTIVE: See HPI.   BREASTS: The patient performs breast self-examination and denies pain, lumps, or nipple " discharge.   HEMATOLOGIC: No easy bruisability or excessive bleeding.   MUSCULOSKELETAL: Denies joint pain or swelling.   NEUROLOGIC: Denies syncope or weakness.   PSYCHIATRIC: Denies depression, anxiety or mood swings.    PHYSICAL EXAM:  APPEARANCE: Well nourished, well developed, in no acute distress.  AFFECT: WNL, alert and oriented x 3  SKIN: No acne or hirsutism  NECK: Neck symmetric without masses or thyromegaly  NODES: No inguinal, cervical, axillary, or femoral lymph node enlargement  CHEST: Good respiratory effect  ABDOMEN: Soft.  No tenderness or masses.  No hepatosplenomegaly.  No hernias.  BREASTS: Symmetrical, no skin changes or visible lesions.  No palpable masses, nipple discharge bilaterally.  PELVIC: Normal external genitalia without lesions.  Normal hair distribution.  Adequate perineal body, normal urethral meatus.  Vagina moist and well rugated without lesions or discharge.  Cervix pink, without lesions, discharge or tenderness.  No significant cystocele or rectocele.  Bimanual exam shows uterus to be normal size, regular, mobile and nontender.  Adnexa without masses or tenderness.    EXTREMITIES: No edema.    Routine gynecological examination  -     ThinPrep Pap Test; Future; Expected date: 03/01/2022    Screening for malignant neoplasm of the cervix  -     ThinPrep Pap Test; Future; Expected date: 03/01/2022    Screening mammogram for high-risk patient  -     Mammo Digital Screening Bilat; Future; Expected date: 03/01/2022    Breakthrough bleeding on OCPs  -     US Pelvis Complete Non OB; Future; Expected date: 03/08/2022    Encounter for surveillance of contraceptive pills    Chronic bilateral thoracic back pain    Other orders  -     drospirenone, contraceptive, (SLYND) 4 mg (28) Tab; Take 1 tablet (4 mg total) by mouth once daily.  Dispense: 30 tablet; Refill: 11      Pt was informed to be fitted for a new bra, to take OTC pain medications like Motrin or Tylenol, and to follow up in clinic  in 6 months/ Pt states she is going to try to take her OCP more regularly.      Patient was counseled today on A.C.S. Pap guidelines and recommendations for yearly pelvic exams, mammograms and monthly self breast exams; to see her PCP for other health maintenance.   Exercise regimen encouraged  Healthy food choices encouraged  Questions answered to desired level of satisfaction  Verbalized understanding to all information and instructions    Follow up in about 6 months (around 9/1/2022) for 1 week U/S and 6 months F/U due to breast pain and 1 year Annual.

## 2022-03-03 LAB
GH SERPL-MCNC: NORMAL NG/ML
INSULIN SERPL-ACNC: NORMAL U[IU]/ML
LAB AP CLINICAL INFORMATION: NORMAL
LAB AP GYN INTERPRETATION: NEGATIVE
LAB AP PAP DISCLAIMER COMMENTS: NORMAL
RENIN PLAS-CCNC: NORMAL NG/ML/H

## 2022-03-04 LAB
HPV 16: NEGATIVE
HPV 18: NEGATIVE
HPV OTHER: NEGATIVE

## 2022-03-09 ENCOUNTER — HOSPITAL ENCOUNTER (OUTPATIENT)
Dept: RADIOLOGY | Facility: HOSPITAL | Age: 42
Discharge: HOME | End: 2022-03-09
Attending: STUDENT IN AN ORGANIZED HEALTH CARE EDUCATION/TRAINING PROGRAM
Payer: COMMERCIAL

## 2022-03-09 DIAGNOSIS — R92.8 ABNORMAL FINDING ON RADIOLOGICAL EXAMINATION OF BREAST: ICD-10-CM

## 2022-03-09 PROCEDURE — 76642 ULTRASOUND BREAST LIMITED: CPT | Mod: TC,50

## 2022-03-10 ENCOUNTER — PROCEDURE VISIT (OUTPATIENT)
Dept: OBSTETRICS AND GYNECOLOGY | Facility: CLINIC | Age: 42
End: 2022-03-10
Payer: COMMERCIAL

## 2022-03-10 DIAGNOSIS — N92.1 BREAKTHROUGH BLEEDING ON OCPS: Primary | ICD-10-CM

## 2022-03-10 PROCEDURE — 99499 UNLISTED E&M SERVICE: CPT | Mod: ,,, | Performed by: OBSTETRICS & GYNECOLOGY

## 2022-03-10 PROCEDURE — 76856 PR  ECHO,PELVIC (NONOBSTETRIC): ICD-10-PCS | Mod: ,,, | Performed by: OBSTETRICS & GYNECOLOGY

## 2022-03-10 PROCEDURE — 99499 NO LOS: ICD-10-PCS | Mod: ,,, | Performed by: OBSTETRICS & GYNECOLOGY

## 2022-03-10 PROCEDURE — 76856 US EXAM PELVIC COMPLETE: CPT | Mod: ,,, | Performed by: OBSTETRICS & GYNECOLOGY

## 2022-03-15 ENCOUNTER — HOSPITAL ENCOUNTER (OUTPATIENT)
Dept: RADIOLOGY | Facility: HOSPITAL | Age: 42
Discharge: HOME | End: 2022-03-15
Attending: RADIOLOGY
Payer: COMMERCIAL

## 2022-03-15 ENCOUNTER — HOSPITAL ENCOUNTER (OUTPATIENT)
Dept: RADIOLOGY | Facility: HOSPITAL | Age: 42
Discharge: HOME | End: 2022-03-15
Attending: STUDENT IN AN ORGANIZED HEALTH CARE EDUCATION/TRAINING PROGRAM
Payer: COMMERCIAL

## 2022-03-15 DIAGNOSIS — R92.8 ABNORMAL FINDING ON BREAST IMAGING: ICD-10-CM

## 2022-03-15 DIAGNOSIS — R92.8 ABNORMAL FINDING ON RADIOLOGICAL EXAMINATION OF BREAST: ICD-10-CM

## 2022-03-15 PROCEDURE — A4648 IMPLANTABLE TISSUE MARKER: HCPCS

## 2022-03-15 PROCEDURE — 88305 TISSUE EXAM BY PATHOLOGIST: CPT | Mod: 26,,, | Performed by: PATHOLOGY

## 2022-03-15 PROCEDURE — 19083 BX BREAST 1ST LESION US IMAG: CPT | Mod: LT

## 2022-03-15 PROCEDURE — 88305 TISSUE EXAM BY PATHOLOGIST: CPT | Mod: SUR | Performed by: STUDENT IN AN ORGANIZED HEALTH CARE EDUCATION/TRAINING PROGRAM

## 2022-03-15 PROCEDURE — 88305 SURGICAL PATHOLOGY: ICD-10-PCS | Mod: 26,,, | Performed by: PATHOLOGY

## 2022-03-15 RX ORDER — LIDOCAINE HYDROCHLORIDE 10 MG/ML
5 INJECTION INFILTRATION; PERINEURAL ONCE
Status: DISCONTINUED | OUTPATIENT
Start: 2022-03-15 | End: 2022-03-16 | Stop reason: HOSPADM

## 2022-03-15 RX ORDER — LIDOCAINE HYDROCHLORIDE AND EPINEPHRINE 10; 10 MG/ML; UG/ML
10 INJECTION, SOLUTION INFILTRATION; PERINEURAL ONCE
Status: DISCONTINUED | OUTPATIENT
Start: 2022-03-15 | End: 2022-03-16 | Stop reason: HOSPADM

## 2022-03-16 LAB
ESTROGEN SERPL-MCNC: NORMAL PG/ML
INSULIN SERPL-ACNC: NORMAL U[IU]/ML
LAB AP GROSS DESCRIPTION: NORMAL
LAB AP LABORATORY NOTES: NORMAL
T3RU NFR SERPL: NORMAL %

## 2022-04-29 NOTE — PROCEDURES
Procedures   Ultrasound note:    Uterus 7.89 x 5.04 x 5.28 cm    Endometrial thickness 17.44 mm   left ovary 28.03 x 19.83 x 15.74 mm  Right ovary 36.782037 x 25.26 mm    Impression:  Mid uterine segment hypoechoic area measuring 1 6 x 1.5 cm  Endometrium had a heterogeneous texture  
birth-1 mo...

## 2022-05-27 ENCOUNTER — OFFICE VISIT (OUTPATIENT)
Dept: FAMILY MEDICINE | Facility: CLINIC | Age: 42
End: 2022-05-27
Payer: COMMERCIAL

## 2022-05-27 VITALS
SYSTOLIC BLOOD PRESSURE: 142 MMHG | BODY MASS INDEX: 31.92 KG/M2 | WEIGHT: 187 LBS | TEMPERATURE: 99 F | HEIGHT: 64 IN | DIASTOLIC BLOOD PRESSURE: 94 MMHG

## 2022-05-27 DIAGNOSIS — Z20.822 CONTACT WITH AND (SUSPECTED) EXPOSURE TO COVID-19: ICD-10-CM

## 2022-05-27 DIAGNOSIS — U07.1 COVID-19: Primary | ICD-10-CM

## 2022-05-27 LAB
CTP QC/QA: YES
FLUAV AG NPH QL: NEGATIVE
FLUBV AG NPH QL: NEGATIVE
SARS-COV-2 AG RESP QL IA.RAPID: POSITIVE

## 2022-05-27 PROCEDURE — 3080F PR MOST RECENT DIASTOLIC BLOOD PRESSURE >= 90 MM HG: ICD-10-PCS | Mod: ,,, | Performed by: NURSE PRACTITIONER

## 2022-05-27 PROCEDURE — 87428 SARSCOV & INF VIR A&B AG IA: CPT | Mod: QW,,, | Performed by: NURSE PRACTITIONER

## 2022-05-27 PROCEDURE — 3008F PR BODY MASS INDEX (BMI) DOCUMENTED: ICD-10-PCS | Mod: ,,, | Performed by: NURSE PRACTITIONER

## 2022-05-27 PROCEDURE — 99213 OFFICE O/P EST LOW 20 MIN: CPT | Mod: ,,, | Performed by: NURSE PRACTITIONER

## 2022-05-27 PROCEDURE — 1159F MED LIST DOCD IN RCRD: CPT | Mod: ,,, | Performed by: NURSE PRACTITIONER

## 2022-05-27 PROCEDURE — 87428 POCT SARS-COV2 (COVID) WITH FLU ANTIGEN: ICD-10-PCS | Mod: QW,,, | Performed by: NURSE PRACTITIONER

## 2022-05-27 PROCEDURE — 1160F PR REVIEW ALL MEDS BY PRESCRIBER/CLIN PHARMACIST DOCUMENTED: ICD-10-PCS | Mod: ,,, | Performed by: NURSE PRACTITIONER

## 2022-05-27 PROCEDURE — 3077F PR MOST RECENT SYSTOLIC BLOOD PRESSURE >= 140 MM HG: ICD-10-PCS | Mod: ,,, | Performed by: NURSE PRACTITIONER

## 2022-05-27 PROCEDURE — 1160F RVW MEDS BY RX/DR IN RCRD: CPT | Mod: ,,, | Performed by: NURSE PRACTITIONER

## 2022-05-27 PROCEDURE — 99213 PR OFFICE/OUTPT VISIT, EST, LEVL III, 20-29 MIN: ICD-10-PCS | Mod: ,,, | Performed by: NURSE PRACTITIONER

## 2022-05-27 PROCEDURE — 3080F DIAST BP >= 90 MM HG: CPT | Mod: ,,, | Performed by: NURSE PRACTITIONER

## 2022-05-27 PROCEDURE — 3077F SYST BP >= 140 MM HG: CPT | Mod: ,,, | Performed by: NURSE PRACTITIONER

## 2022-05-27 PROCEDURE — 1159F PR MEDICATION LIST DOCUMENTED IN MEDICAL RECORD: ICD-10-PCS | Mod: ,,, | Performed by: NURSE PRACTITIONER

## 2022-05-27 PROCEDURE — 3008F BODY MASS INDEX DOCD: CPT | Mod: ,,, | Performed by: NURSE PRACTITIONER

## 2022-05-27 NOTE — LETTER
May 27, 2022    Jw NELSON  [address] [place] [address]             [facility]  [address] [facility] [place] [address]  Phone: [phone]  Fax: [phone]   May 27, 2022     Patient: Jw NELSON   YOB: 1980   Date of Visit: 5/27/2022       To Whom it May Concern:    Jw NELSON was seen in my clinic on 5/27/2022. She may return to work on 6/2/22 and then must wear a mask for 5 additional days until 6/7/22.    Please excuse her from any classes or work missed.    If you have any questions or concerns, please don't hesitate to call.    Sincerely,         CELESTE Gamboa

## 2022-05-27 NOTE — PROGRESS NOTES
[facility]    Chief Complaint      Chief Complaint   Patient presents with    Documentation Only     Yesterday, sneezing a lot; vac; no excuse needed    Chills    Sore Throat    Nasal Congestion    Headache    Fever     99.3 rn with no tylenol    Chest Congestion       History of Present Illness      Jw NELSON is a 41 y.o. female with chronic conditions of HTN who presents today for URI type symptoms since yesterday.    Past Medical History:  Past Medical History:   Diagnosis Date    Essential (primary) hypertension        Past Surgical History:   has a past surgical history that includes Dilation and curettage of uterus (2011).    Social History:  Social History     Tobacco Use    Smoking status: Never Smoker    Smokeless tobacco: Never Used       I personally reviewed all past medical, surgical, and social.     Review of Systems   Constitutional: Positive for chills and fever. Negative for fatigue.   HENT: Positive for congestion and sore throat. Negative for rhinorrhea and sneezing.    Respiratory: Positive for cough. Negative for shortness of breath.    Gastrointestinal: Negative for diarrhea, nausea and vomiting.   Musculoskeletal: Negative for myalgias.   Skin: Negative for rash.   Neurological: Positive for headaches.        Medications:  Outpatient Encounter Medications as of 5/27/2022   Medication Sig Dispense Refill    amLODIPine (NORVASC) 5 MG tablet Take 1 tablet (5 mg total) by mouth once daily. 90 tablet 3    drospirenone, contraceptive, (SLYND) 4 mg (28) Tab Take 1 tablet (4 mg total) by mouth once daily. 30 tablet 11    ergocalciferol (ERGOCALCIFEROL) 50,000 unit Cap TAKE 1 CAPSULE BY MOUTH ONCE A WEEK 4 capsule 11    triamcinolone acetonide 0.1% (KENALOG) 0.1 % cream Apply topically 2 (two) times daily. Do not use on face. 453.6 g 5    venlafaxine (EFFEXOR-XR) 37.5 MG 24 hr capsule Take 37.5 mg by mouth once daily.      meclizine (ANTIVERT) 25 mg tablet Take 1  "tablet (25 mg total) by mouth 3 (three) times daily as needed for Dizziness or Nausea. (Patient not taking: Reported on 5/27/2022) 60 tablet 0     No facility-administered encounter medications on file as of 5/27/2022.       Allergies:  Review of patient's allergies indicates:  No Known Allergies    Health Maintenance:    There is no immunization history on file for this patient.   Health Maintenance   Topic Date Due    Hepatitis C Screening  Never done    Lipid Panel  Never done    TETANUS VACCINE  Never done    Mammogram  03/15/2023        Physical Exam      Vital Signs  Temp: 99.3 °F (37.4 °C)  Temp src: Oral  BP: (!) 142/94  BP Location: Left arm  Patient Position: Sitting  Height and Weight  Height: 5' 4" (162.6 cm)  Weight: 84.8 kg (187 lb)  BSA (Calculated - sq m): 1.96 sq meters  BMI (Calculated): 32.1  Weight in (lb) to have BMI = 25: 145.3]    Physical Exam  Vitals and nursing note reviewed.   Constitutional:       General: She is not in acute distress.     Appearance: Normal appearance. She is well-developed. She is obese. She is ill-appearing.   HENT:      Head: Normocephalic.      Right Ear: Hearing normal.      Left Ear: Hearing normal.      Nose: Congestion present.   Eyes:      General: Lids are normal.      Extraocular Movements: Extraocular movements intact.      Conjunctiva/sclera: Conjunctivae normal.      Pupils: Pupils are equal, round, and reactive to light.   Cardiovascular:      Rate and Rhythm: Normal rate and regular rhythm.      Heart sounds: Normal heart sounds.   Pulmonary:      Effort: Pulmonary effort is normal.      Breath sounds: Normal breath sounds.   Musculoskeletal:         General: Normal range of motion.      Cervical back: Normal range of motion and neck supple.   Skin:     General: Skin is warm and dry.   Neurological:      Mental Status: She is alert and oriented to person, place, and time.      Gait: Gait is intact.   Psychiatric:         Behavior: Behavior is " danna.          Laboratory:  CBC:  Recent Labs   Lab 07/12/20  2359 07/14/20  0439 10/11/21  1450   WBC 10.03 15.37 H 7.19   RBC 3.78 L 2.95 L 5.12   Hemoglobin 10.6 L 8.3 L 14.0   Hematocrit 32.3 L 25.4 L 42.7   Platelet Count 244 202 378   MCV 85.4 86.1 83.4   MCH 28.0 28.1 27.3   MCHC 32.8 32.7 32.8     CMP:  Recent Labs   Lab 10/11/21  1450   Glucose 106   Calcium 9.4   Albumin 3.8   Total Protein 7.2   Sodium 140   Potassium 4.1   CO2 28   Chloride 107   BUN 15   Alk Phos 63   ALT 18   AST 15   Bilirubin, Total 1.1     LIPIDS:  Recent Labs   Lab 10/11/21  1450   TSH 0.451     TSH:  Recent Labs   Lab 10/11/21  1450   TSH 0.451     A1C:        Assessment/Plan     Jw NELSON is a 41 y.o.female with:     1. Contact with and (suspected) exposure to covid-19  - POCT SARS-COV2 (COVID) with Flu Antigen    2. COVID-19      Instructed to take Zyrtec, Pepcid, and Vit C OTC daily x 10 days    Total time spent face-to-face and non-face-to-face coordinating care for this encounter was: 30 min    Chronic conditions status updated as per HPI.  Other than changes above, cont current medications and maintain follow up with specialists.  Return to clinic as needed.    JESUS GamboaP  [facility]

## 2022-06-30 ENCOUNTER — OFFICE VISIT (OUTPATIENT)
Dept: FAMILY MEDICINE | Facility: CLINIC | Age: 42
End: 2022-06-30
Payer: COMMERCIAL

## 2022-06-30 VITALS
TEMPERATURE: 99 F | RESPIRATION RATE: 18 BRPM | SYSTOLIC BLOOD PRESSURE: 140 MMHG | HEART RATE: 80 BPM | WEIGHT: 190 LBS | DIASTOLIC BLOOD PRESSURE: 96 MMHG | OXYGEN SATURATION: 98 % | BODY MASS INDEX: 32.44 KG/M2 | HEIGHT: 64 IN

## 2022-06-30 DIAGNOSIS — Z13.220 SCREENING FOR LIPOID DISORDERS: ICD-10-CM

## 2022-06-30 DIAGNOSIS — Z00.00 WELL ADULT EXAM: Primary | ICD-10-CM

## 2022-06-30 DIAGNOSIS — Z13.1 SCREENING FOR DIABETES MELLITUS: ICD-10-CM

## 2022-06-30 DIAGNOSIS — I10 ESSENTIAL HYPERTENSION: ICD-10-CM

## 2022-06-30 LAB
CHOLEST SERPL-MCNC: 136 MG/DL (ref 0–200)
CHOLEST/HDLC SERPL: 3.2 {RATIO}
EST. AVERAGE GLUCOSE BLD GHB EST-MCNC: 100 MG/DL
GLUCOSE SERPL-MCNC: 103 MG/DL (ref 74–106)
HBA1C MFR BLD HPLC: 5.6 % (ref 4.5–6.6)
HDLC SERPL-MCNC: 43 MG/DL (ref 40–60)
LDLC SERPL CALC-MCNC: 78 MG/DL
LDLC/HDLC SERPL: 1.8 {RATIO}
NONHDLC SERPL-MCNC: 93 MG/DL
TRIGL SERPL-MCNC: 76 MG/DL (ref 35–150)
VLDLC SERPL-MCNC: 15 MG/DL

## 2022-06-30 PROCEDURE — 3044F HG A1C LEVEL LT 7.0%: CPT | Mod: ,,, | Performed by: NURSE PRACTITIONER

## 2022-06-30 PROCEDURE — 80061 LIPID PANEL: ICD-10-PCS | Mod: ,,, | Performed by: CLINICAL MEDICAL LABORATORY

## 2022-06-30 PROCEDURE — 3008F PR BODY MASS INDEX (BMI) DOCUMENTED: ICD-10-PCS | Mod: ,,, | Performed by: NURSE PRACTITIONER

## 2022-06-30 PROCEDURE — 3080F PR MOST RECENT DIASTOLIC BLOOD PRESSURE >= 90 MM HG: ICD-10-PCS | Mod: ,,, | Performed by: NURSE PRACTITIONER

## 2022-06-30 PROCEDURE — 80061 LIPID PANEL: CPT | Mod: ,,, | Performed by: CLINICAL MEDICAL LABORATORY

## 2022-06-30 PROCEDURE — 1160F RVW MEDS BY RX/DR IN RCRD: CPT | Mod: ,,, | Performed by: NURSE PRACTITIONER

## 2022-06-30 PROCEDURE — 1159F MED LIST DOCD IN RCRD: CPT | Mod: ,,, | Performed by: NURSE PRACTITIONER

## 2022-06-30 PROCEDURE — 1160F PR REVIEW ALL MEDS BY PRESCRIBER/CLIN PHARMACIST DOCUMENTED: ICD-10-PCS | Mod: ,,, | Performed by: NURSE PRACTITIONER

## 2022-06-30 PROCEDURE — 99396 PREV VISIT EST AGE 40-64: CPT | Mod: ,,, | Performed by: NURSE PRACTITIONER

## 2022-06-30 PROCEDURE — 3080F DIAST BP >= 90 MM HG: CPT | Mod: ,,, | Performed by: NURSE PRACTITIONER

## 2022-06-30 PROCEDURE — 83036 HEMOGLOBIN GLYCOSYLATED A1C: CPT | Mod: ,,, | Performed by: CLINICAL MEDICAL LABORATORY

## 2022-06-30 PROCEDURE — 99396 PR PREVENTIVE VISIT,EST,40-64: ICD-10-PCS | Mod: ,,, | Performed by: NURSE PRACTITIONER

## 2022-06-30 PROCEDURE — 1159F PR MEDICATION LIST DOCUMENTED IN MEDICAL RECORD: ICD-10-PCS | Mod: ,,, | Performed by: NURSE PRACTITIONER

## 2022-06-30 PROCEDURE — 82947 GLUCOSE, FASTING: ICD-10-PCS | Mod: ,,, | Performed by: CLINICAL MEDICAL LABORATORY

## 2022-06-30 PROCEDURE — 3077F PR MOST RECENT SYSTOLIC BLOOD PRESSURE >= 140 MM HG: ICD-10-PCS | Mod: ,,, | Performed by: NURSE PRACTITIONER

## 2022-06-30 PROCEDURE — 3008F BODY MASS INDEX DOCD: CPT | Mod: ,,, | Performed by: NURSE PRACTITIONER

## 2022-06-30 PROCEDURE — 82947 ASSAY GLUCOSE BLOOD QUANT: CPT | Mod: ,,, | Performed by: CLINICAL MEDICAL LABORATORY

## 2022-06-30 PROCEDURE — 3077F SYST BP >= 140 MM HG: CPT | Mod: ,,, | Performed by: NURSE PRACTITIONER

## 2022-06-30 PROCEDURE — 83036 HEMOGLOBIN A1C: ICD-10-PCS | Mod: ,,, | Performed by: CLINICAL MEDICAL LABORATORY

## 2022-06-30 PROCEDURE — 3044F PR MOST RECENT HEMOGLOBIN A1C LEVEL <7.0%: ICD-10-PCS | Mod: ,,, | Performed by: NURSE PRACTITIONER

## 2022-06-30 RX ORDER — AMLODIPINE BESYLATE 10 MG/1
10 TABLET ORAL DAILY
Qty: 30 TABLET | Refills: 11 | Status: SHIPPED | OUTPATIENT
Start: 2022-06-30 | End: 2023-04-18 | Stop reason: SDUPTHER

## 2022-06-30 NOTE — PROGRESS NOTES
[facility]    Chief Complaint      Chief Complaint   Patient presents with    Annual Exam     Healthy You Exam, states not fasting @ this time        History of Present Illness      Jw Martins is a 41 y.o. female with chronic conditions of HTN who presents today for Healthy You.  Reports she had her pap exam and mammogram in March of this year.     Past Medical History:  Past Medical History:   Diagnosis Date    Essential (primary) hypertension        Past Surgical History:   has a past surgical history that includes Dilation and curettage of uterus (2011).    Social History:  Social History     Tobacco Use    Smoking status: Never Smoker    Smokeless tobacco: Never Used       I personally reviewed all past medical, surgical, and social.     Review of Systems   Constitutional: Negative for fatigue.   HENT: Negative for ear pain and sore throat.    Eyes: Negative for pain, discharge and visual disturbance.   Respiratory: Negative for cough and shortness of breath.    Cardiovascular: Negative.    Gastrointestinal: Negative for abdominal pain, constipation and diarrhea.   Genitourinary: Negative for dysuria, menstrual problem and vaginal discharge.   Musculoskeletal: Negative for gait problem.   Skin: Negative.    Neurological: Negative for dizziness and light-headedness.        Medications:  Outpatient Encounter Medications as of 6/30/2022   Medication Sig Dispense Refill    drospirenone, contraceptive, (SLYND) 4 mg (28) Tab Take 1 tablet (4 mg total) by mouth once daily. 30 tablet 11    ergocalciferol (ERGOCALCIFEROL) 50,000 unit Cap TAKE 1 CAPSULE BY MOUTH ONCE A WEEK 4 capsule 11    triamcinolone acetonide 0.1% (KENALOG) 0.1 % cream Apply topically 2 (two) times daily. Do not use on face. 453.6 g 5    venlafaxine (EFFEXOR-XR) 37.5 MG 24 hr capsule Take 37.5 mg by mouth once daily.      [DISCONTINUED] amLODIPine (NORVASC) 5 MG tablet Take 1 tablet (5 mg total) by mouth once daily. 90  "tablet 3    amLODIPine (NORVASC) 10 MG tablet Take 1 tablet (10 mg total) by mouth once daily. 30 tablet 11    meclizine (ANTIVERT) 25 mg tablet Take 1 tablet (25 mg total) by mouth 3 (three) times daily as needed for Dizziness or Nausea. (Patient not taking: No sig reported) 60 tablet 0     No facility-administered encounter medications on file as of 6/30/2022.       Allergies:  Review of patient's allergies indicates:  No Known Allergies    Health Maintenance:    There is no immunization history on file for this patient.   Health Maintenance   Topic Date Due    Hepatitis C Screening  Never done    TETANUS VACCINE  Never done    Mammogram  03/15/2023    Lipid Panel  Completed        Physical Exam      Vital Signs  Temp: 98.5 °F (36.9 °C)  Pulse: 80  Resp: 18  SpO2: 98 %  BP: (!) 140/96  Pain Score: 0-No pain  Height and Weight  Height: 5' 4" (162.6 cm)  Weight: 86.2 kg (190 lb)  BSA (Calculated - sq m): 1.97 sq meters  BMI (Calculated): 32.6  Weight in (lb) to have BMI = 25: 145.3]    Physical Exam  Vitals and nursing note reviewed.   Constitutional:       Appearance: Normal appearance. She is well-developed.   HENT:      Head: Normocephalic.      Right Ear: Hearing normal.      Left Ear: Hearing normal.      Nose: Nose normal.   Eyes:      General: Lids are normal.      Extraocular Movements: Extraocular movements intact.      Conjunctiva/sclera: Conjunctivae normal.      Pupils: Pupils are equal, round, and reactive to light.   Cardiovascular:      Rate and Rhythm: Normal rate and regular rhythm.      Heart sounds: Normal heart sounds.   Pulmonary:      Effort: Pulmonary effort is normal.      Breath sounds: Normal breath sounds.   Musculoskeletal:         General: Normal range of motion.      Cervical back: Normal range of motion and neck supple.      Right lower leg: No edema.      Left lower leg: No edema.   Skin:     General: Skin is warm and dry.   Neurological:      Mental Status: She is alert and " oriented to person, place, and time.      Gait: Gait is intact.   Psychiatric:         Behavior: Behavior is cooperative.          Laboratory:  CBC:  Recent Labs   Lab 07/12/20  2359 07/14/20  0439 10/11/21  1450   WBC 10.03 15.37 H 7.19   RBC 3.78 L 2.95 L 5.12   Hemoglobin 10.6 L 8.3 L 14.0   Hematocrit 32.3 L 25.4 L 42.7   Platelet Count 244 202 378   MCV 85.4 86.1 83.4   MCH 28.0 28.1 27.3   MCHC 32.8 32.7 32.8     CMP:  Recent Labs   Lab 10/11/21  1450   Glucose 106   Calcium 9.4   Albumin 3.8   Total Protein 7.2   Sodium 140   Potassium 4.1   CO2 28   Chloride 107   BUN 15   Alk Phos 63   ALT 18   AST 15   Bilirubin, Total 1.1     LIPIDS:  Recent Labs   Lab 10/11/21  1450 06/30/22  1013   TSH 0.451  --    HDL Cholesterol  --  43   Cholesterol  --  136   Triglycerides  --  76   LDL Calculated  --  78   Cholesterol/HDL Ratio (Risk Factor)  --  3.2   Non-HDL  --  93     TSH:  Recent Labs   Lab 10/11/21  1450   TSH 0.451     A1C:  Recent Labs   Lab 06/30/22  1013   Hemoglobin A1C 5.6       Assessment/Plan     Jw Martins is a 41 y.o.female with:     1. Well adult exam    2. Screening for lipoid disorders  - Lipid Panel; Future  - Lipid Panel    3. Screening for diabetes mellitus  - Glucose, Fasting; Future  - Hemoglobin A1C; Future  - Glucose, Fasting  - Hemoglobin A1C    4. Essential hypertension  - amLODIPine (NORVASC) 10 MG tablet; Take 1 tablet (10 mg total) by mouth once daily.  Dispense: 30 tablet; Refill: 11     Reviewed diet and lifestyle modifications  Recommend 30 mins of Cardio style exercise at least 3x weekly      Total time spent face-to-face and non-face-to-face coordinating care for this encounter was: 30 min    Chronic conditions status updated as per HPI.  Other than changes above, cont current medications and maintain follow up with specialists.  Return to clinic 1 year or as needed.    Priscilla Murphy, P  [facility]

## 2022-07-08 ENCOUNTER — TELEPHONE (OUTPATIENT)
Dept: FAMILY MEDICINE | Facility: CLINIC | Age: 42
End: 2022-07-08
Payer: COMMERCIAL

## 2022-07-08 NOTE — TELEPHONE ENCOUNTER
----- Message from CELESTE Gamboa sent at 7/8/2022  7:32 AM CDT -----  Patient's labs are all normal

## 2022-07-10 ENCOUNTER — TELEPHONE (OUTPATIENT)
Dept: FAMILY MEDICINE | Facility: CLINIC | Age: 42
End: 2022-07-10
Payer: COMMERCIAL

## 2022-09-06 ENCOUNTER — OFFICE VISIT (OUTPATIENT)
Dept: OBSTETRICS AND GYNECOLOGY | Facility: CLINIC | Age: 42
End: 2022-09-06
Payer: COMMERCIAL

## 2022-09-06 VITALS
HEIGHT: 64 IN | TEMPERATURE: 99 F | HEART RATE: 65 BPM | WEIGHT: 191.81 LBS | DIASTOLIC BLOOD PRESSURE: 90 MMHG | BODY MASS INDEX: 32.74 KG/M2 | SYSTOLIC BLOOD PRESSURE: 130 MMHG

## 2022-09-06 DIAGNOSIS — M54.6 CHRONIC BILATERAL THORACIC BACK PAIN: ICD-10-CM

## 2022-09-06 DIAGNOSIS — G89.29 CHRONIC BILATERAL THORACIC BACK PAIN: ICD-10-CM

## 2022-09-06 DIAGNOSIS — N62 LARGE BREASTS: Primary | ICD-10-CM

## 2022-09-06 PROCEDURE — 3080F PR MOST RECENT DIASTOLIC BLOOD PRESSURE >= 90 MM HG: ICD-10-PCS | Mod: ,,, | Performed by: OBSTETRICS & GYNECOLOGY

## 2022-09-06 PROCEDURE — 99214 PR OFFICE/OUTPT VISIT, EST, LEVL IV, 30-39 MIN: ICD-10-PCS | Mod: ,,, | Performed by: OBSTETRICS & GYNECOLOGY

## 2022-09-06 PROCEDURE — 1159F MED LIST DOCD IN RCRD: CPT | Mod: ,,, | Performed by: OBSTETRICS & GYNECOLOGY

## 2022-09-06 PROCEDURE — 3075F SYST BP GE 130 - 139MM HG: CPT | Mod: ,,, | Performed by: OBSTETRICS & GYNECOLOGY

## 2022-09-06 PROCEDURE — 3075F PR MOST RECENT SYSTOLIC BLOOD PRESS GE 130-139MM HG: ICD-10-PCS | Mod: ,,, | Performed by: OBSTETRICS & GYNECOLOGY

## 2022-09-06 PROCEDURE — 99214 OFFICE O/P EST MOD 30 MIN: CPT | Mod: ,,, | Performed by: OBSTETRICS & GYNECOLOGY

## 2022-09-06 PROCEDURE — 1159F PR MEDICATION LIST DOCUMENTED IN MEDICAL RECORD: ICD-10-PCS | Mod: ,,, | Performed by: OBSTETRICS & GYNECOLOGY

## 2022-09-06 PROCEDURE — 3044F HG A1C LEVEL LT 7.0%: CPT | Mod: ,,, | Performed by: OBSTETRICS & GYNECOLOGY

## 2022-09-06 PROCEDURE — 3008F PR BODY MASS INDEX (BMI) DOCUMENTED: ICD-10-PCS | Mod: ,,, | Performed by: OBSTETRICS & GYNECOLOGY

## 2022-09-06 PROCEDURE — 3080F DIAST BP >= 90 MM HG: CPT | Mod: ,,, | Performed by: OBSTETRICS & GYNECOLOGY

## 2022-09-06 PROCEDURE — 3008F BODY MASS INDEX DOCD: CPT | Mod: ,,, | Performed by: OBSTETRICS & GYNECOLOGY

## 2022-09-06 PROCEDURE — 3044F PR MOST RECENT HEMOGLOBIN A1C LEVEL <7.0%: ICD-10-PCS | Mod: ,,, | Performed by: OBSTETRICS & GYNECOLOGY

## 2022-09-06 NOTE — PROGRESS NOTES
Subjective:       Patient ID: Jw Martins is a 41 y.o. female.    Chief Complaint:  Breast Pain      History of Present Illness  Pt states that despite taking OTC pain meds and has been fitted for better support bras as well as a new chair at work for support, her back, neck and shoulder pain has gotten worse. She also feels like her breast are continuing tp swell are larger than her past visit. She is still requesting a breast reduction.        GYN & OB History  Patient's last menstrual period was 2022 (approximate).   Date of Last Pap: No result found    OB History    Para Term  AB Living   3 1 1   1 1   SAB IAB Ectopic Multiple Live Births   1       1      # Outcome Date GA Lbr Lamberto/2nd Weight Sex Delivery Anes PTL Lv   3  20   2.977 kg (6 lb 9 oz) F Vag-Spont   PHILIP   2 2011     SAB      1 Term                Review of Systems  Review of Systems   Constitutional:  Negative for activity change, appetite change, fatigue and unexpected weight change.   HENT: Negative.     Respiratory:  Negative for cough, shortness of breath and wheezing.    Cardiovascular:  Negative for chest pain, palpitations and leg swelling.   Gastrointestinal:  Negative for abdominal pain, bloating, blood in stool, constipation, diarrhea, nausea, vomiting and reflux.   Genitourinary:  Negative for bladder incontinence, decreased libido, dysmenorrhea, dyspareunia, dysuria, flank pain, frequency, menorrhagia, menstrual problem, pelvic pain, urgency, vaginal bleeding, vaginal discharge, postcoital bleeding and vaginal odor.   Musculoskeletal:  Positive for back pain (upper back and shoulder pain).   Integumentary:  Negative for rash, acne, hair changes, mole/lesion, breast mass, nipple discharge, breast skin changes and breast tenderness.   Neurological:  Negative for headaches.   Psychiatric/Behavioral:  Negative for depression and sleep disturbance. The patient is not nervous/anxious.    Breast:  Negative for asymmetry, breast self exam, lump, mass, nipple discharge, skin changes and tenderness        Objective:    Physical Exam:   Constitutional: She is oriented to person, place, and time. She appears well-developed and well-nourished.    HENT:   Head: Normocephalic and atraumatic.    Eyes: Pupils are equal, round, and reactive to light. EOM are normal.     Cardiovascular:  Normal rate, regular rhythm and normal heart sounds.             Pulmonary/Chest: Effort normal and breath sounds normal.        Abdominal: Soft. Bowel sounds are normal.             Musculoskeletal: Normal range of motion and moves all extremeties.       Neurological: She is alert and oriented to person, place, and time. She has normal reflexes.     Psychiatric: She has a normal mood and affect. Her behavior is normal. Judgment and thought content normal.        Assessment:        1. Large breasts    2. Chronic bilateral thoracic back pain                Plan:      Referral to Plastic Surgery for breast reduction consultation

## 2023-03-07 ENCOUNTER — OFFICE VISIT (OUTPATIENT)
Dept: OBSTETRICS AND GYNECOLOGY | Facility: CLINIC | Age: 43
End: 2023-03-07
Payer: COMMERCIAL

## 2023-03-07 VITALS
HEART RATE: 76 BPM | SYSTOLIC BLOOD PRESSURE: 130 MMHG | WEIGHT: 185.63 LBS | BODY MASS INDEX: 31.86 KG/M2 | DIASTOLIC BLOOD PRESSURE: 92 MMHG

## 2023-03-07 DIAGNOSIS — N92.1 BREAKTHROUGH BLEEDING ON OCPS: ICD-10-CM

## 2023-03-07 DIAGNOSIS — I10 PRIMARY HYPERTENSION: ICD-10-CM

## 2023-03-07 DIAGNOSIS — Z12.31 SCREENING MAMMOGRAM FOR BREAST CANCER: ICD-10-CM

## 2023-03-07 DIAGNOSIS — Z30.016 ENCOUNTER FOR INITIAL PRESCRIPTION OF TRANSDERMAL PATCH HORMONAL CONTRACEPTIVE DEVICE: ICD-10-CM

## 2023-03-07 DIAGNOSIS — Z30.41 ENCOUNTER FOR SURVEILLANCE OF CONTRACEPTIVE PILLS: ICD-10-CM

## 2023-03-07 DIAGNOSIS — Z01.419 ROUTINE GYNECOLOGICAL EXAMINATION: Primary | ICD-10-CM

## 2023-03-07 PROCEDURE — 3075F SYST BP GE 130 - 139MM HG: CPT | Mod: ,,, | Performed by: OBSTETRICS & GYNECOLOGY

## 2023-03-07 PROCEDURE — 3008F PR BODY MASS INDEX (BMI) DOCUMENTED: ICD-10-PCS | Mod: ,,, | Performed by: OBSTETRICS & GYNECOLOGY

## 2023-03-07 PROCEDURE — 3080F PR MOST RECENT DIASTOLIC BLOOD PRESSURE >= 90 MM HG: ICD-10-PCS | Mod: ,,, | Performed by: OBSTETRICS & GYNECOLOGY

## 2023-03-07 PROCEDURE — 3008F BODY MASS INDEX DOCD: CPT | Mod: ,,, | Performed by: OBSTETRICS & GYNECOLOGY

## 2023-03-07 PROCEDURE — 3075F PR MOST RECENT SYSTOLIC BLOOD PRESS GE 130-139MM HG: ICD-10-PCS | Mod: ,,, | Performed by: OBSTETRICS & GYNECOLOGY

## 2023-03-07 PROCEDURE — 99396 PREV VISIT EST AGE 40-64: CPT | Mod: ,,, | Performed by: OBSTETRICS & GYNECOLOGY

## 2023-03-07 PROCEDURE — 1159F MED LIST DOCD IN RCRD: CPT | Mod: ,,, | Performed by: OBSTETRICS & GYNECOLOGY

## 2023-03-07 PROCEDURE — 3080F DIAST BP >= 90 MM HG: CPT | Mod: ,,, | Performed by: OBSTETRICS & GYNECOLOGY

## 2023-03-07 PROCEDURE — 1159F PR MEDICATION LIST DOCUMENTED IN MEDICAL RECORD: ICD-10-PCS | Mod: ,,, | Performed by: OBSTETRICS & GYNECOLOGY

## 2023-03-07 PROCEDURE — 99396 PR PREVENTIVE VISIT,EST,40-64: ICD-10-PCS | Mod: ,,, | Performed by: OBSTETRICS & GYNECOLOGY

## 2023-03-07 RX ORDER — DROSPIRENONE 4 MG/1
4 TABLET, FILM COATED ORAL DAILY
Qty: 30 TABLET | Refills: 11 | Status: SHIPPED | OUTPATIENT
Start: 2023-03-07 | End: 2023-03-07 | Stop reason: ALTCHOICE

## 2023-03-07 RX ORDER — NORELGESTROMIN AND ETHINYL ESTRADIOL 35; 150 UG/MG; UG/MG
1 PATCH TRANSDERMAL
Qty: 4 PATCH | Refills: 11 | Status: SHIPPED | OUTPATIENT
Start: 2023-03-07 | End: 2024-03-06

## 2023-03-07 NOTE — PROGRESS NOTES
CC: Well woman exam    Jw Martins is a 42 y.o. female  presents for well woman exam.    LMP: Patient's last menstrual period was 2023 (approximate)..    Last mammogram: 2022  Last Colonoscopy: 2021    Denies any issues, problems, or complaints.     Past Medical History:   Diagnosis Date    Essential (primary) hypertension      Past Surgical History:   Procedure Laterality Date    DILATION AND CURETTAGE OF UTERUS      Surgery done in [place]     Social History     Socioeconomic History    Marital status:    Tobacco Use    Smoking status: Never     Passive exposure: Never    Smokeless tobacco: Never     Family History   Problem Relation Age of Onset    Hypertension Mother     Hypertension Father     Diabetes Father     Hypertension Maternal Grandmother     Hypertension Maternal Grandfather     Diabetes Maternal Grandfather     Hypertension Paternal Grandmother     Hypertension Paternal Grandfather      OB History          3    Para   1    Term   1            AB   1    Living   1         SAB   1    IAB        Ectopic        Multiple        Live Births   1                 BP (!) 130/92   Pulse 76   Wt 84.2 kg (185 lb 9.6 oz)   LMP 2023 (Approximate)   BMI 31.86 kg/m²       ROS:  GENERAL: Denies weight gain or weight loss. Feeling well overall.   SKIN: Denies rash or lesions.   HEAD: Denies head injury or headache.   NODES: Denies enlarged lymph nodes.   CHEST: Denies chest pain or shortness of breath.   CARDIOVASCULAR: Denies palpitations or left sided chest pain.   ABDOMEN: No abdominal pain, constipation, diarrhea, nausea, vomiting or rectal bleeding.   URINARY: No frequency, dysuria, hematuria, or burning on urination.  REPRODUCTIVE: See HPI.   BREASTS: The patient performs breast self-examination and denies pain, lumps, or nipple discharge.   HEMATOLOGIC: No easy bruisability or excessive bleeding.   MUSCULOSKELETAL: Denies joint pain or swelling.    NEUROLOGIC: Denies syncope or weakness.   PSYCHIATRIC: Denies depression, anxiety or mood swings.    PHYSICAL EXAM:  APPEARANCE: Well nourished, well developed, in no acute distress.  AFFECT: WNL, alert and oriented x 3  SKIN: No acne or hirsutism  NECK: Neck symmetric without masses or thyromegaly  NODES: No inguinal, cervical, axillary, or femoral lymph node enlargement  CHEST: Good respiratory effect  ABDOMEN: Soft.  No tenderness or masses.  No hepatosplenomegaly.  No hernias.  BREASTS: Symmetrical, no skin changes or visible lesions.  No palpable masses, nipple discharge bilaterally.  PELVIC: Normal external genitalia without lesions.  Normal hair distribution.  Adequate perineal body, normal urethral meatus.  Vagina moist and well rugated without lesions or discharge.  Cervix pink, without lesions, discharge or tenderness.  No significant cystocele or rectocele.  Bimanual exam shows uterus to be normal size, regular, mobile and nontender.  Adnexa without masses or tenderness.    EXTREMITIES: No edema.    Routine gynecological examination    Encounter for surveillance of contraceptive pills  -     Discontinue: drospirenone, contraceptive, (SLYND) 4 mg (28) Tab; Take 1 tablet (4 mg total) by mouth once daily.  Dispense: 30 tablet; Refill: 11    Screening mammogram for breast cancer  -     Mammo Digital Screening Bilat; Future; Expected date: 03/07/2023    Breakthrough bleeding on OCPs  -     norelgestromin-ethinyl estradiol 150-35 mcg/24 hr; Place 1 patch onto the skin every 7 days.  Dispense: 4 patch; Refill: 11    Encounter for initial prescription of transdermal patch hormonal contraceptive device  -     norelgestromin-ethinyl estradiol 150-35 mcg/24 hr; Place 1 patch onto the skin every 7 days.  Dispense: 4 patch; Refill: 11    Primary hypertension  -     Ambulatory referral/consult to Family Practice; Future; Expected date: 03/14/2023            Patient was counseled today on A.C.S. Pap guidelines and  recommendations for yearly pelvic exams, mammograms and monthly self breast exams; to see her PCP for other health maintenance.   Exercise regimen encouraged  Healthy food choices encouraged  Questions answered to desired level of satisfaction  Verbalized understanding to all information and instructions    Follow up in about 1 year (around 3/7/2024) for Annual.      Charlie Arenas M.D., FCOG    OB/GYN

## 2023-04-18 ENCOUNTER — OFFICE VISIT (OUTPATIENT)
Dept: FAMILY MEDICINE | Facility: CLINIC | Age: 43
End: 2023-04-18
Payer: COMMERCIAL

## 2023-04-18 VITALS
HEIGHT: 66 IN | SYSTOLIC BLOOD PRESSURE: 132 MMHG | BODY MASS INDEX: 29.89 KG/M2 | DIASTOLIC BLOOD PRESSURE: 84 MMHG | HEART RATE: 64 BPM | OXYGEN SATURATION: 99 % | WEIGHT: 186 LBS

## 2023-04-18 DIAGNOSIS — D17.0 LIPOMA OF NECK: ICD-10-CM

## 2023-04-18 DIAGNOSIS — I10 ESSENTIAL HYPERTENSION: Primary | ICD-10-CM

## 2023-04-18 LAB
ANION GAP SERPL CALCULATED.3IONS-SCNC: 10 MMOL/L (ref 7–16)
BUN SERPL-MCNC: 21 MG/DL (ref 7–18)
BUN/CREAT SERPL: 23 (ref 6–20)
CALCIUM SERPL-MCNC: 9 MG/DL (ref 8.5–10.1)
CHLORIDE SERPL-SCNC: 107 MMOL/L (ref 98–107)
CO2 SERPL-SCNC: 27 MMOL/L (ref 21–32)
CREAT SERPL-MCNC: 0.9 MG/DL (ref 0.55–1.02)
EGFR (NO RACE VARIABLE) (RUSH/TITUS): 83 ML/MIN/1.73M²
GLUCOSE SERPL-MCNC: 91 MG/DL (ref 74–106)
POTASSIUM SERPL-SCNC: 4.8 MMOL/L (ref 3.5–5.1)
SODIUM SERPL-SCNC: 139 MMOL/L (ref 136–145)

## 2023-04-18 PROCEDURE — 1160F RVW MEDS BY RX/DR IN RCRD: CPT | Mod: ,,, | Performed by: FAMILY MEDICINE

## 2023-04-18 PROCEDURE — 1160F PR REVIEW ALL MEDS BY PRESCRIBER/CLIN PHARMACIST DOCUMENTED: ICD-10-PCS | Mod: ,,, | Performed by: FAMILY MEDICINE

## 2023-04-18 PROCEDURE — 3008F PR BODY MASS INDEX (BMI) DOCUMENTED: ICD-10-PCS | Mod: ,,, | Performed by: FAMILY MEDICINE

## 2023-04-18 PROCEDURE — 1159F MED LIST DOCD IN RCRD: CPT | Mod: ,,, | Performed by: FAMILY MEDICINE

## 2023-04-18 PROCEDURE — 3079F PR MOST RECENT DIASTOLIC BLOOD PRESSURE 80-89 MM HG: ICD-10-PCS | Mod: ,,, | Performed by: FAMILY MEDICINE

## 2023-04-18 PROCEDURE — 3008F BODY MASS INDEX DOCD: CPT | Mod: ,,, | Performed by: FAMILY MEDICINE

## 2023-04-18 PROCEDURE — 3079F DIAST BP 80-89 MM HG: CPT | Mod: ,,, | Performed by: FAMILY MEDICINE

## 2023-04-18 PROCEDURE — 1159F PR MEDICATION LIST DOCUMENTED IN MEDICAL RECORD: ICD-10-PCS | Mod: ,,, | Performed by: FAMILY MEDICINE

## 2023-04-18 PROCEDURE — 80048 BASIC METABOLIC PANEL: ICD-10-PCS | Mod: ,,, | Performed by: CLINICAL MEDICAL LABORATORY

## 2023-04-18 PROCEDURE — 99214 OFFICE O/P EST MOD 30 MIN: CPT | Mod: ,,, | Performed by: FAMILY MEDICINE

## 2023-04-18 PROCEDURE — 99214 PR OFFICE/OUTPT VISIT, EST, LEVL IV, 30-39 MIN: ICD-10-PCS | Mod: ,,, | Performed by: FAMILY MEDICINE

## 2023-04-18 PROCEDURE — 3075F PR MOST RECENT SYSTOLIC BLOOD PRESS GE 130-139MM HG: ICD-10-PCS | Mod: ,,, | Performed by: FAMILY MEDICINE

## 2023-04-18 PROCEDURE — 3075F SYST BP GE 130 - 139MM HG: CPT | Mod: ,,, | Performed by: FAMILY MEDICINE

## 2023-04-18 PROCEDURE — 80048 BASIC METABOLIC PNL TOTAL CA: CPT | Mod: ,,, | Performed by: CLINICAL MEDICAL LABORATORY

## 2023-04-18 RX ORDER — AMLODIPINE BESYLATE 10 MG/1
10 TABLET ORAL DAILY
Qty: 90 TABLET | Refills: 1 | Status: SHIPPED | OUTPATIENT
Start: 2023-04-18 | End: 2023-07-06 | Stop reason: SDUPTHER

## 2023-04-18 NOTE — PROGRESS NOTES
[facility]    Chief Complaint      Chief Complaint   Patient presents with    Establish Care       History of Present Illness      Jw Martins is a 42 y.o. female with hypertension who presents today for and establishment of care visit and medication refills.    The patient has been out of Amlodipine for a few months.      Past Medical History:  History reviewed. No pertinent past medical history.      Past Surgical History:   has a past surgical history that includes Dilation and curettage of uterus (2011).    Social History:  Social History     Tobacco Use    Smoking status: Never     Passive exposure: Never    Smokeless tobacco: Never       I personally reviewed all past medical, surgical, and social.     Review of Systems   Constitutional:  Negative for chills and fever.   HENT:  Negative for ear pain and sore throat.    Eyes:  Negative for blurred vision.   Respiratory:  Negative for cough and shortness of breath.    Cardiovascular:  Negative for chest pain and palpitations.   Gastrointestinal:  Negative for abdominal pain and constipation.   Genitourinary:  Negative for dysuria and hematuria.   Musculoskeletal:  Positive for neck pain. Negative for back pain and falls.        The patient states that she has had a lump on the right side of her neck that has recently gotten bigger.  She states that she has been having some neck pain that is going into her right arm.   Skin:  Negative for itching and rash.   Neurological:  Negative for weakness and headaches.   Endo/Heme/Allergies:  Negative for polydipsia. Does not bruise/bleed easily.   Psychiatric/Behavioral:  Negative for suicidal ideas. The patient does not have insomnia.       Medications:  Outpatient Encounter Medications as of 4/18/2023   Medication Sig Dispense Refill    norelgestromin-ethinyl estradiol 150-35 mcg/24 hr Place 1 patch onto the skin every 7 days. 4 patch 11    [DISCONTINUED] amLODIPine (NORVASC) 10 MG tablet Take 1 tablet  "(10 mg total) by mouth once daily. 30 tablet 11    [DISCONTINUED] venlafaxine (EFFEXOR-XR) 37.5 MG 24 hr capsule Take 37.5 mg by mouth once daily.      amLODIPine (NORVASC) 10 MG tablet Take 1 tablet (10 mg total) by mouth once daily. 90 tablet 1     No facility-administered encounter medications on file as of 4/18/2023.       Allergies:  Review of patient's allergies indicates:  No Known Allergies    Health Maintenance:  Immunization History   Administered Date(s) Administered    Influenza 11/03/2021      Health Maintenance   Topic Date Due    TETANUS VACCINE  Never done    Mammogram  03/15/2023    Lipid Panel  Completed    Hepatitis C Screening  Addressed        Physical Exam      Vital Signs  Pulse: 64  SpO2: 99 %  BP: 132/84  BP Location: Right arm  Patient Position: Sitting  Height and Weight  Height: 5' 5.5" (166.4 cm)  Weight: 84.4 kg (186 lb)  BSA (Calculated - sq m): 1.97 sq meters  BMI (Calculated): 30.5  Weight in (lb) to have BMI = 25: 152.2]    Physical Exam  Constitutional:       Appearance: Normal appearance.   HENT:      Head: Normocephalic and atraumatic.   Neck:     Cardiovascular:      Rate and Rhythm: Normal rate and regular rhythm.      Heart sounds: Normal heart sounds.   Pulmonary:      Effort: Pulmonary effort is normal.      Breath sounds: Normal breath sounds.   Skin:     Findings: No rash.   Neurological:      General: No focal deficit present.      Mental Status: She is alert and oriented to person, place, and time.      Gait: Gait normal.   Psychiatric:         Mood and Affect: Mood normal.         Behavior: Behavior normal.         Thought Content: Thought content normal.         Judgment: Judgment normal.        Laboratory:  CBC:  Recent Labs   Lab 07/12/20  2359 07/14/20  0439 10/11/21  1450   WBC 10.03 15.37 H 7.19   RBC 3.78 L 2.95 L 5.12   Hemoglobin 10.6 L 8.3 L 14.0   Hematocrit 32.3 L 25.4 L 42.7   Platelet Count 244 202 378   MCV 85.4 86.1 83.4   MCH 28.0 28.1 27.3   MCHC 32.8 " 32.7 32.8     CMP:  Recent Labs   Lab 10/11/21  1450 04/18/23  0934   Glucose 106 91   Calcium 9.4 9.0   Albumin 3.8  --    Total Protein 7.2  --    Sodium 140 139   Potassium 4.1 4.8   CO2 28 27   Chloride 107 107   BUN 15 21 H   Alk Phos 63  --    ALT 18  --    AST 15  --    Bilirubin, Total 1.1  --      LIPIDS:  Recent Labs   Lab 10/11/21  1450 06/30/22  1013   TSH 0.451  --    HDL Cholesterol  --  43   Cholesterol  --  136   Triglycerides  --  76   LDL Calculated  --  78   Cholesterol/HDL Ratio (Risk Factor)  --  3.2   Non-HDL  --  93     TSH:  Recent Labs   Lab 10/11/21  1450   TSH 0.451     A1C:  Recent Labs   Lab 06/30/22  1013   Hemoglobin A1C 5.6       Assessment/Plan     Jw Martins is a 42 y.o.female with:    1. Essential hypertension  - amLODIPine (NORVASC) 10 MG tablet; Take 1 tablet (10 mg total) by mouth once daily.  Dispense: 90 tablet; Refill: 1  - Basic Metabolic Panel; Future  - Basic Metabolic Panel    2. Lipoma of neck  - Ambulatory referral/consult to General Surgery; Future       Chronic conditions status updated as per HPI.  Other than changes above, cont current medications and maintain follow up with specialists.  Return to clinic in 3 months.    Nasrin Bolivar DO  [facility]

## 2023-05-02 ENCOUNTER — OFFICE VISIT (OUTPATIENT)
Dept: SURGERY | Facility: CLINIC | Age: 43
End: 2023-05-02
Attending: SURGERY
Payer: COMMERCIAL

## 2023-05-02 ENCOUNTER — HOSPITAL ENCOUNTER (OUTPATIENT)
Dept: RADIOLOGY | Facility: HOSPITAL | Age: 43
Discharge: HOME | End: 2023-05-02
Attending: SURGERY
Payer: COMMERCIAL

## 2023-05-02 DIAGNOSIS — M54.2 NECK PAIN: ICD-10-CM

## 2023-05-02 DIAGNOSIS — M54.2 NECK PAIN: Primary | ICD-10-CM

## 2023-05-02 DIAGNOSIS — D17.0 LIPOMA OF NECK: ICD-10-CM

## 2023-05-02 PROCEDURE — 1159F PR MEDICATION LIST DOCUMENTED IN MEDICAL RECORD: ICD-10-PCS | Mod: ,,, | Performed by: SURGERY

## 2023-05-02 PROCEDURE — 72040 X-RAY EXAM NECK SPINE 2-3 VW: CPT | Mod: TC

## 2023-05-02 PROCEDURE — 72040 XR CERVICAL SPINE AP LATERAL: ICD-10-PCS | Mod: 26,,, | Performed by: STUDENT IN AN ORGANIZED HEALTH CARE EDUCATION/TRAINING PROGRAM

## 2023-05-02 PROCEDURE — 99213 OFFICE O/P EST LOW 20 MIN: CPT | Mod: PBBFAC | Performed by: SURGERY

## 2023-05-02 PROCEDURE — 72040 X-RAY EXAM NECK SPINE 2-3 VW: CPT | Mod: 26,,, | Performed by: STUDENT IN AN ORGANIZED HEALTH CARE EDUCATION/TRAINING PROGRAM

## 2023-05-02 PROCEDURE — 1159F MED LIST DOCD IN RCRD: CPT | Mod: ,,, | Performed by: SURGERY

## 2023-05-02 PROCEDURE — 99204 OFFICE O/P NEW MOD 45 MIN: CPT | Mod: S$PBB,,, | Performed by: SURGERY

## 2023-05-02 PROCEDURE — 99204 PR OFFICE/OUTPT VISIT, NEW, LEVL IV, 45-59 MIN: ICD-10-PCS | Mod: S$PBB,,, | Performed by: SURGERY

## 2023-05-02 NOTE — ASSESSMENT & PLAN NOTE
We will delayed resection until further workup of neurologic symptoms of the neck have been performed.  We will start with a C-spine series, follow-up studies to see if further imaging or referral to spine surgery is necessary.

## 2023-05-02 NOTE — PROGRESS NOTES
Subjective:       Patient ID: Jw Martins is a 42 y.o. female.    Chief Complaint: Mass (Right shoulder/neck area)    This 42-year-old female patient presents with a soft tissue mass overlying the right posterior neck.  She reports a sensation of pain when turning her neck to the right side that improves wound putting pressure over this soft tissue mass.  She also reports a numbness sensation over the right face anterior to the ear.  She believes that the soft tissue mass has been present over a period of 3 years, and might be slightly enlarging.    Mass    family history includes Diabetes in her father and maternal grandfather; Hypertension in her father, maternal grandfather, maternal grandmother, mother, paternal grandfather, and paternal grandmother.  Past Medical History:   Diagnosis Date    Hypertension       Past Surgical History:   Procedure Laterality Date    DILATION AND CURETTAGE OF UTERUS  2011    Surgery done in [place]    REDUCTION OF BOTH BREASTS         reports that she has never smoked. She has never been exposed to tobacco smoke. She has never used smokeless tobacco. She reports that she does not use drugs.     Review of Systems   All other systems reviewed and are negative.       Objective:      There were no vitals taken for this visit.   Physical Exam  Cardiovascular:      Rate and Rhythm: Normal rate.   Pulmonary:      Effort: Pulmonary effort is normal.   Abdominal:      Palpations: Abdomen is soft.   Skin:     Comments: Posterior and lateral right neck with a well-defined soft tissue mass, partially mobile and smooth margins measuring 6 cm in greatest dimension.   Neurological:      General: No focal deficit present.      Mental Status: She is alert.   Psychiatric:         Mood and Affect: Mood normal.       Assessment/Plan:         Neck pain  -     X-Ray Cervical Spine AP And Lateral; Future; Expected date: 05/02/2023    Lipoma of neck  -     Ambulatory referral/consult to General  Surgery  -     X-Ray Cervical Spine AP And Lateral; Future; Expected date: 05/02/2023         Problem List Items Addressed This Visit          Oncology    Lipoma of neck    Current Assessment & Plan     We will delayed resection until further workup of neurologic symptoms of the neck have been performed.  We will start with a C-spine series, follow-up studies to see if further imaging or referral to spine surgery is necessary.           Relevant Orders    X-Ray Cervical Spine AP And Lateral       Orthopedic    Neck pain - Primary    Overview     Related to lateral rotation           Current Assessment & Plan     Check C-spine series, possible referral to spine surgery           Relevant Orders    X-Ray Cervical Spine AP And Lateral

## 2023-07-06 RX ORDER — DROSPIRENONE 4 MG/1
TABLET, FILM COATED ORAL
COMMUNITY
Start: 2023-04-19 | End: 2024-03-19 | Stop reason: SDUPTHER

## 2023-07-18 ENCOUNTER — OFFICE VISIT (OUTPATIENT)
Dept: FAMILY MEDICINE | Facility: CLINIC | Age: 43
End: 2023-07-18
Payer: COMMERCIAL

## 2023-07-18 VITALS
SYSTOLIC BLOOD PRESSURE: 134 MMHG | BODY MASS INDEX: 29.82 KG/M2 | HEART RATE: 82 BPM | HEIGHT: 65 IN | OXYGEN SATURATION: 97 % | DIASTOLIC BLOOD PRESSURE: 80 MMHG | WEIGHT: 179 LBS

## 2023-07-18 DIAGNOSIS — I10 ESSENTIAL HYPERTENSION: Primary | ICD-10-CM

## 2023-07-18 DIAGNOSIS — Z12.31 ENCOUNTER FOR SCREENING MAMMOGRAM FOR MALIGNANT NEOPLASM OF BREAST: ICD-10-CM

## 2023-07-18 PROCEDURE — 3079F DIAST BP 80-89 MM HG: CPT | Mod: ,,, | Performed by: FAMILY MEDICINE

## 2023-07-18 PROCEDURE — 3075F SYST BP GE 130 - 139MM HG: CPT | Mod: ,,, | Performed by: FAMILY MEDICINE

## 2023-07-18 PROCEDURE — 3075F PR MOST RECENT SYSTOLIC BLOOD PRESS GE 130-139MM HG: ICD-10-PCS | Mod: ,,, | Performed by: FAMILY MEDICINE

## 2023-07-18 PROCEDURE — 1160F RVW MEDS BY RX/DR IN RCRD: CPT | Mod: ,,, | Performed by: FAMILY MEDICINE

## 2023-07-18 PROCEDURE — 3079F PR MOST RECENT DIASTOLIC BLOOD PRESSURE 80-89 MM HG: ICD-10-PCS | Mod: ,,, | Performed by: FAMILY MEDICINE

## 2023-07-18 PROCEDURE — 3008F BODY MASS INDEX DOCD: CPT | Mod: ,,, | Performed by: FAMILY MEDICINE

## 2023-07-18 PROCEDURE — 1159F MED LIST DOCD IN RCRD: CPT | Mod: ,,, | Performed by: FAMILY MEDICINE

## 2023-07-18 PROCEDURE — 99213 PR OFFICE/OUTPT VISIT, EST, LEVL III, 20-29 MIN: ICD-10-PCS | Mod: ,,, | Performed by: FAMILY MEDICINE

## 2023-07-18 PROCEDURE — 1159F PR MEDICATION LIST DOCUMENTED IN MEDICAL RECORD: ICD-10-PCS | Mod: ,,, | Performed by: FAMILY MEDICINE

## 2023-07-18 PROCEDURE — 1160F PR REVIEW ALL MEDS BY PRESCRIBER/CLIN PHARMACIST DOCUMENTED: ICD-10-PCS | Mod: ,,, | Performed by: FAMILY MEDICINE

## 2023-07-18 PROCEDURE — 3008F PR BODY MASS INDEX (BMI) DOCUMENTED: ICD-10-PCS | Mod: ,,, | Performed by: FAMILY MEDICINE

## 2023-07-18 PROCEDURE — 99213 OFFICE O/P EST LOW 20 MIN: CPT | Mod: ,,, | Performed by: FAMILY MEDICINE

## 2023-07-18 RX ORDER — AMLODIPINE BESYLATE 10 MG/1
10 TABLET ORAL DAILY
Qty: 90 TABLET | Refills: 1 | Status: SHIPPED | OUTPATIENT
Start: 2023-07-18 | End: 2023-11-07 | Stop reason: SDUPTHER

## 2023-07-19 ENCOUNTER — PATIENT OUTREACH (OUTPATIENT)
Dept: ADMINISTRATIVE | Facility: HOSPITAL | Age: 43
End: 2023-07-19

## 2023-07-21 ENCOUNTER — PATIENT OUTREACH (OUTPATIENT)
Dept: ADMINISTRATIVE | Facility: HOSPITAL | Age: 43
End: 2023-07-21

## 2023-07-21 NOTE — PROGRESS NOTES
Health maintenance record review for population health care gaps    Uploaded recent vaccine to patient health maintenance for care gaps    Health Maintenance Due   Topic Date Due    TETANUS VACCINE  07/19/2010    COVID-19 Vaccine (3 - Moderna series) 03/29/2021    Mammogram  03/15/2023

## 2023-07-24 NOTE — PROGRESS NOTES
[facility]    Chief Complaint      Chief Complaint   Patient presents with    Hypertension     3 month       History of Present Illness      Jw Martins is a 42 y.o. female with chronic conditions of  has a past medical history of Hypertension.      Hypertension  Pertinent negatives include no blurred vision, chest pain, headaches, palpitations or shortness of breath.     The patient presents today for a three month follow up visit. She has no complaints.      Past Medical History:  Past Medical History:   Diagnosis Date    Hypertension        Past Surgical History:   has a past surgical history that includes Dilation and curettage of uterus (2011) and Reduction of both breasts.    Social History:  Social History     Tobacco Use    Smoking status: Never     Passive exposure: Never    Smokeless tobacco: Never   Substance Use Topics    Drug use: Never       I personally reviewed all past medical, surgical, and social.     Review of Systems   Constitutional:  Negative for chills and fever.   HENT:  Negative for ear pain and sore throat.    Eyes:  Negative for blurred vision.   Respiratory:  Negative for cough and shortness of breath.    Cardiovascular:  Negative for chest pain and palpitations.   Gastrointestinal:  Negative for abdominal pain and constipation.   Genitourinary:  Negative for dysuria and hematuria.   Musculoskeletal:  Negative for back pain and falls.   Skin:  Negative for itching and rash.   Neurological:  Negative for weakness and headaches.   Endo/Heme/Allergies:  Negative for polydipsia. Does not bruise/bleed easily.   Psychiatric/Behavioral:  Negative for suicidal ideas. The patient does not have insomnia.       Medications:  Outpatient Encounter Medications as of 7/18/2023   Medication Sig Dispense Refill    norelgestromin-ethinyl estradiol 150-35 mcg/24 hr Place 1 patch onto the skin every 7 days. 4 patch 11    SLYND 4 mg (28) Tab       amLODIPine (NORVASC) 10 MG tablet Take 1  "tablet (10 mg total) by mouth once daily. 90 tablet 1    [DISCONTINUED] amLODIPine (NORVASC) 10 MG tablet Take 1 tablet (10 mg total) by mouth once daily. 90 tablet 1     No facility-administered encounter medications on file as of 7/18/2023.       Allergies:  Review of patient's allergies indicates:  No Known Allergies    Health Maintenance:  Immunization History   Administered Date(s) Administered    COVID-19, MRNA, LN-S, PF (MODERNA FULL 0.5 ML DOSE) 01/04/2021, 02/01/2021    Influenza 11/03/2021    Td - PF (ADULT) 07/19/2000      Health Maintenance   Topic Date Due    TETANUS VACCINE  07/19/2010    Mammogram  03/15/2023    Lipid Panel  Completed    Hepatitis C Screening  Addressed        Physical Exam      Vital Signs  Pulse: 82  SpO2: 97 %  BP: 134/80  BP Location: Right arm  Patient Position: Sitting  Height and Weight  Height: 5' 5" (165.1 cm)  Weight: 81.2 kg (179 lb)  BSA (Calculated - sq m): 1.93 sq meters  BMI (Calculated): 29.8  Weight in (lb) to have BMI = 25: 149.9]    Physical Exam  Vitals and nursing note reviewed.   Constitutional:       Appearance: Normal appearance.   HENT:      Head: Normocephalic and atraumatic.      Nose: Nose normal.   Eyes:      Extraocular Movements: Extraocular movements intact.      Conjunctiva/sclera: Conjunctivae normal.      Pupils: Pupils are equal, round, and reactive to light.   Cardiovascular:      Rate and Rhythm: Normal rate and regular rhythm.      Heart sounds: Normal heart sounds.   Pulmonary:      Effort: Pulmonary effort is normal.      Breath sounds: Normal breath sounds.   Skin:     Findings: No rash.   Neurological:      General: No focal deficit present.      Mental Status: She is alert and oriented to person, place, and time. Mental status is at baseline.      Cranial Nerves: No cranial nerve deficit.      Gait: Gait normal.   Psychiatric:         Mood and Affect: Mood normal.         Behavior: Behavior normal.         Thought Content: Thought content " normal.         Judgment: Judgment normal.        Laboratory:  CBC:  Recent Labs   Lab 10/11/21  1450   WBC 7.19   RBC 5.12   Hemoglobin 14.0   Hematocrit 42.7   Platelet Count 378   MCV 83.4   MCH 27.3   MCHC 32.8     CMP:  Recent Labs   Lab 10/11/21  1450 04/18/23  0934   Glucose 106 91   Calcium 9.4 9.0   Albumin 3.8  --    Total Protein 7.2  --    Sodium 140 139   Potassium 4.1 4.8   CO2 28 27   Chloride 107 107   BUN 15 21 H   Alk Phos 63  --    ALT 18  --    AST 15  --    Bilirubin, Total 1.1  --      LIPIDS:  Recent Labs   Lab 10/11/21  1450 06/30/22  1013   TSH 0.451  --    HDL Cholesterol  --  43   Cholesterol  --  136   Triglycerides  --  76   LDL Calculated  --  78   Cholesterol/HDL Ratio (Risk Factor)  --  3.2   Non-HDL  --  93     TSH:  Recent Labs   Lab 10/11/21  1450   TSH 0.451     A1C:  Recent Labs   Lab 06/30/22  1013   Hemoglobin A1C 5.6       Assessment/Plan     Jw Martins is a 42 y.o.female with:    1. Essential hypertension  - amLODIPine (NORVASC) 10 MG tablet; Take 1 tablet (10 mg total) by mouth once daily.  Dispense: 90 tablet; Refill: 1    2. Encounter for screening mammogram for malignant neoplasm of breast  - Mammo Digital Screening Bilat; Future       Chronic conditions status updated as per HPI.  Other than changes above, cont current medications and maintain follow up with specialists.  Return to clinic in 3 month(s).     Nasrin Bolivar DO  [facility]

## 2023-11-07 ENCOUNTER — OFFICE VISIT (OUTPATIENT)
Dept: FAMILY MEDICINE | Facility: CLINIC | Age: 43
End: 2023-11-07
Payer: COMMERCIAL

## 2023-11-07 VITALS
OXYGEN SATURATION: 97 % | HEART RATE: 91 BPM | DIASTOLIC BLOOD PRESSURE: 72 MMHG | BODY MASS INDEX: 29.12 KG/M2 | HEIGHT: 65 IN | SYSTOLIC BLOOD PRESSURE: 120 MMHG | WEIGHT: 174.81 LBS | RESPIRATION RATE: 20 BRPM

## 2023-11-07 DIAGNOSIS — Z13.220 SCREENING FOR LIPOID DISORDERS: ICD-10-CM

## 2023-11-07 DIAGNOSIS — Z00.00 ROUTINE GENERAL MEDICAL EXAMINATION AT A HEALTH CARE FACILITY: Primary | ICD-10-CM

## 2023-11-07 DIAGNOSIS — Z23 NEEDS FLU SHOT: ICD-10-CM

## 2023-11-07 DIAGNOSIS — Z13.1 SCREENING FOR DIABETES MELLITUS: ICD-10-CM

## 2023-11-07 DIAGNOSIS — I10 ESSENTIAL HYPERTENSION: ICD-10-CM

## 2023-11-07 LAB
EST. AVERAGE GLUCOSE BLD GHB EST-MCNC: 97 MG/DL
HBA1C MFR BLD HPLC: 5.5 % (ref 4.5–6.6)

## 2023-11-07 PROCEDURE — 1159F MED LIST DOCD IN RCRD: CPT | Mod: ,,, | Performed by: FAMILY MEDICINE

## 2023-11-07 PROCEDURE — 82947 ASSAY GLUCOSE BLOOD QUANT: CPT | Mod: ,,, | Performed by: CLINICAL MEDICAL LABORATORY

## 2023-11-07 PROCEDURE — 80061 LIPID PANEL: ICD-10-PCS | Mod: ,,, | Performed by: CLINICAL MEDICAL LABORATORY

## 2023-11-07 PROCEDURE — 3008F PR BODY MASS INDEX (BMI) DOCUMENTED: ICD-10-PCS | Mod: ,,, | Performed by: FAMILY MEDICINE

## 2023-11-07 PROCEDURE — 90471 FLU VACCINE (QUAD) GREATER THAN OR EQUAL TO 3YO PRESERVATIVE FREE IM: ICD-10-PCS | Mod: ,,, | Performed by: FAMILY MEDICINE

## 2023-11-07 PROCEDURE — 99396 PREV VISIT EST AGE 40-64: CPT | Mod: 25,,, | Performed by: FAMILY MEDICINE

## 2023-11-07 PROCEDURE — 3044F HG A1C LEVEL LT 7.0%: CPT | Mod: ,,, | Performed by: FAMILY MEDICINE

## 2023-11-07 PROCEDURE — 90686 IIV4 VACC NO PRSV 0.5 ML IM: CPT | Mod: ,,, | Performed by: FAMILY MEDICINE

## 2023-11-07 PROCEDURE — 90471 IMMUNIZATION ADMIN: CPT | Mod: ,,, | Performed by: FAMILY MEDICINE

## 2023-11-07 PROCEDURE — 3008F BODY MASS INDEX DOCD: CPT | Mod: ,,, | Performed by: FAMILY MEDICINE

## 2023-11-07 PROCEDURE — 82947 GLUCOSE, FASTING: ICD-10-PCS | Mod: ,,, | Performed by: CLINICAL MEDICAL LABORATORY

## 2023-11-07 PROCEDURE — 83036 HEMOGLOBIN A1C: ICD-10-PCS | Mod: ,,, | Performed by: CLINICAL MEDICAL LABORATORY

## 2023-11-07 PROCEDURE — 99396 PR PREVENTIVE VISIT,EST,40-64: ICD-10-PCS | Mod: 25,,, | Performed by: FAMILY MEDICINE

## 2023-11-07 PROCEDURE — 3074F PR MOST RECENT SYSTOLIC BLOOD PRESSURE < 130 MM HG: ICD-10-PCS | Mod: ,,, | Performed by: FAMILY MEDICINE

## 2023-11-07 PROCEDURE — 80061 LIPID PANEL: CPT | Mod: ,,, | Performed by: CLINICAL MEDICAL LABORATORY

## 2023-11-07 PROCEDURE — 3044F PR MOST RECENT HEMOGLOBIN A1C LEVEL <7.0%: ICD-10-PCS | Mod: ,,, | Performed by: FAMILY MEDICINE

## 2023-11-07 PROCEDURE — 3078F PR MOST RECENT DIASTOLIC BLOOD PRESSURE < 80 MM HG: ICD-10-PCS | Mod: ,,, | Performed by: FAMILY MEDICINE

## 2023-11-07 PROCEDURE — 1159F PR MEDICATION LIST DOCUMENTED IN MEDICAL RECORD: ICD-10-PCS | Mod: ,,, | Performed by: FAMILY MEDICINE

## 2023-11-07 PROCEDURE — 3078F DIAST BP <80 MM HG: CPT | Mod: ,,, | Performed by: FAMILY MEDICINE

## 2023-11-07 PROCEDURE — 3074F SYST BP LT 130 MM HG: CPT | Mod: ,,, | Performed by: FAMILY MEDICINE

## 2023-11-07 PROCEDURE — 83036 HEMOGLOBIN GLYCOSYLATED A1C: CPT | Mod: ,,, | Performed by: CLINICAL MEDICAL LABORATORY

## 2023-11-07 PROCEDURE — 90686 FLU VACCINE (QUAD) GREATER THAN OR EQUAL TO 3YO PRESERVATIVE FREE IM: ICD-10-PCS | Mod: ,,, | Performed by: FAMILY MEDICINE

## 2023-11-07 PROCEDURE — 1160F PR REVIEW ALL MEDS BY PRESCRIBER/CLIN PHARMACIST DOCUMENTED: ICD-10-PCS | Mod: ,,, | Performed by: FAMILY MEDICINE

## 2023-11-07 PROCEDURE — 1160F RVW MEDS BY RX/DR IN RCRD: CPT | Mod: ,,, | Performed by: FAMILY MEDICINE

## 2023-11-07 RX ORDER — AMLODIPINE BESYLATE 10 MG/1
10 TABLET ORAL DAILY
Qty: 90 TABLET | Refills: 1 | Status: SHIPPED | OUTPATIENT
Start: 2023-11-07

## 2023-11-07 NOTE — PROGRESS NOTES
Subjective     Patient ID: Jw Martins is a 42 y.o. female.    Chief Complaint: Follow-up (3 month follow up) and Healthy You    Hypertension  This is a recurrent problem. The current episode started more than 1 year ago. The problem has been gradually improving since onset. The problem is controlled. Pertinent negatives include no anxiety, blurred vision, chest pain, headaches, malaise/fatigue, neck pain, orthopnea, palpitations, peripheral edema, PND, shortness of breath or sweats. There are no associated agents to hypertension. There are no known risk factors for coronary artery disease. Past treatments include nothing. There are no compliance problems.      Review of Systems   Constitutional:  Negative for fatigue, fever and malaise/fatigue.   HENT:  Negative for sore throat.    Eyes:  Negative for blurred vision.   Respiratory:  Negative for shortness of breath.    Cardiovascular:  Negative for chest pain, palpitations, orthopnea and PND.   Gastrointestinal:  Negative for abdominal pain.   Genitourinary:  Negative for dysuria.   Musculoskeletal:  Negative for back pain and neck pain.   Integumentary:  Negative for rash.   Neurological:  Negative for headaches.   All other systems reviewed and are negative.      Tobacco Use: Low Risk  (11/7/2023)    Patient History     Smoking Tobacco Use: Never     Smokeless Tobacco Use: Never     Passive Exposure: Never     Review of patient's allergies indicates:  No Known Allergies  Current Outpatient Medications   Medication Instructions    amLODIPine (NORVASC) 10 mg, Oral, Daily    norelgestromin-ethinyl estradiol 150-35 mcg/24 hr 1 patch, Transdermal, Every 7 days    SLYND 4 mg (28) Tab No dose, route, or frequency recorded.     Medications Discontinued During This Encounter   Medication Reason    amLODIPine (NORVASC) 10 MG tablet Reorder       Past Medical History:   Diagnosis Date    Hypertension      Health Maintenance Topics with due status: Not Due        "Topic Last Completion Date    Cervical Cancer Screening 03/01/2022    Hemoglobin A1c (Diabetic Prevention Screening) 11/07/2023     Immunization History   Administered Date(s) Administered    COVID-19, MRNA, LN-S, PF (MODERNA FULL 0.5 ML DOSE) 01/04/2021, 02/01/2021    Influenza 11/03/2021    Influenza - Quadrivalent - PF *Preferred* (6 months and older) 11/07/2023    Td - PF (ADULT) 07/19/2000       Objective     Body mass index is 29.09 kg/m².  Wt Readings from Last 3 Encounters:   11/07/23 79.3 kg (174 lb 12.8 oz)   07/18/23 81.2 kg (179 lb)   04/18/23 84.4 kg (186 lb)     Ht Readings from Last 3 Encounters:   11/07/23 5' 5" (1.651 m)   07/18/23 5' 5" (1.651 m)   04/18/23 5' 5.5" (1.664 m)     BP Readings from Last 3 Encounters:   11/07/23 120/72   07/18/23 134/80   04/18/23 132/84     Temp Readings from Last 3 Encounters:   09/06/22 98.7 °F (37.1 °C) (Oral)   06/30/22 98.5 °F (36.9 °C)   05/27/22 99.3 °F (37.4 °C) (Oral)     Pulse Readings from Last 3 Encounters:   11/07/23 91   07/18/23 82   04/18/23 64     Resp Readings from Last 3 Encounters:   11/07/23 20   06/30/22 18   03/01/22 18     PF Readings from Last 3 Encounters:   No data found for PF       Physical Exam  Constitutional:       Appearance: Normal appearance.   HENT:      Head: Normocephalic and atraumatic.      Right Ear: Hearing and external ear normal.      Left Ear: Hearing and external ear normal.   Eyes:      Extraocular Movements: Extraocular movements intact.      Conjunctiva/sclera: Conjunctivae normal.      Pupils: Pupils are equal, round, and reactive to light.   Neck:      Thyroid: No thyroid mass, thyromegaly or thyroid tenderness.   Cardiovascular:      Rate and Rhythm: Normal rate and regular rhythm.      Heart sounds: Normal heart sounds.   Pulmonary:      Effort: Pulmonary effort is normal.      Breath sounds: Normal breath sounds.   Musculoskeletal:      Cervical back: Normal range of motion and neck supple.   Skin:     Findings: No " rash.   Neurological:      General: No focal deficit present.      Mental Status: She is alert and oriented to person, place, and time.      Cranial Nerves: No cranial nerve deficit.      Gait: Gait normal.   Psychiatric:         Mood and Affect: Mood normal.         Behavior: Behavior normal.         Thought Content: Thought content normal.         Judgment: Judgment normal.         Assessment and Plan     Problem List Items Addressed This Visit          Cardiac/Vascular    Essential hypertension    Relevant Medications    amLODIPine (NORVASC) 10 MG tablet     Other Visit Diagnoses       Routine general medical examination at a health care facility    -  Primary    Screening for diabetes mellitus        Relevant Orders    Hemoglobin A1C (Completed)    Glucose, Fasting (Completed)    Screening for lipoid disorders        Relevant Orders    Lipid Panel (Completed)    Needs flu shot        Relevant Orders    Influenza - Quadrivalent (PF) (Completed)            Plan:       I have reviewed the medications, allergies, and problem list.     Goal Actions:    What type of visit is the patient here for today?: Healthy You  Does the patient consent to enroll in [employer] Healthy?: Yes  Is this a Wellness Follow Up?: Yes  What is your overall wellness goal? (select at least one): Improve overall health  Choose 3: Lifestyle, Exercise, Nutrition  Lifestyle Actions : Develop good support system, Obtain yearly mammogram  Nurtrition Actions: Avoid adding table salt, Eat a well-balanced diet, drink 8-10 glasses of water per day  Exercise Actions: Recommend physical activity 30 minutes per day 3-5 times/week

## 2023-11-07 NOTE — PATIENT INSTRUCTIONS
"Patient Education       Diet and Health   The Basics   Written by the doctors and editors at [facility]   Why is it important to eat a healthy diet? -- It's important to eat a healthy diet because eating the right foods can keep you healthy now and later on in life.  Which foods are especially healthy? -- Foods that are especially healthy include:  Fruits and vegetables - Eating a diet with lots of fruits and vegetables can help prevent heart disease and strokes. It might also help prevent certain types of cancers. Try to eat fruits and vegetables at each meal and also for snacks. If you don't have fresh fruits and vegetables available, you can eat frozen or canned ones instead. Doctors recommend eating at least 2 1/2 servings of vegetables and 2 servings of fruits each day.  Foods with fiber - Eating foods with a lot of fiber can help prevent heart disease and strokes. If you have type 2 diabetes, it can also help control your blood sugar. Foods that have a lot of fiber include vegetables, fruits, beans, nuts, oatmeal, and whole grain breads and cereals. You can tell how much fiber is in a food by reading the nutrition label (figure 1). Doctors recommend eating 25 to 36 grams of fiber each day.  Foods with folate (also called folic acid) - Folate is a vitamin that is important for pregnant people, since it helps prevent certain birth defects. Anyone who could get pregnant should get at least 400 micrograms of folic acid daily, whether or not they are actively trying to get pregnant. Folate is found in many breakfast cereals, oranges, orange juice, and green leafy vegetables.  Foods with calcium and vitamin D - Babies, children, and adults need calcium and vitamin D to help keep their bones strong. Adults also need calcium and vitamin D to help prevent osteoporosis. Osteoporosis is a condition that causes bones to get "thin" and break more easily than usual. Different foods and drinks have calcium and vitamin D in " "them (figure 2). People who don't get enough calcium and vitamin D in their diet might need to take a supplement.  Foods with protein - Protein helps your muscles stay strong. Healthy foods with a lot of protein include chicken, fish, eggs, beans, nuts, and soy products. Red meat also has a lot of protein, but it also contains fats, which can be unhealthy.  Some experts recommend a "Mediterranean diet." This involves eating a lot of fruits, vegetables, nuts, whole grains, and olive oil. It also includes some fish, poultry, and dairy products, but not a lot of red meat. Eating this way can help your overall health, and might even lower your risk of having a stroke.  What foods should I avoid or limit? -- To eat a healthy diet, there are some things you should avoid or limit. They include:   Fats - There are different types of fats. Some types of fats are better for your body than others.  Trans fats are especially unhealthy. They are found in margarines, many fast foods, and some store-bought baked goods. Trans fats can raise your cholesterol level and your increase your chance of getting heart disease. You should avoid eating foods with these types of fats.  The type of "polyunsaturated" fats found in fish seems to be healthy and can reduce your chance of getting heart disease. Other polyunsaturated fats might also be good for your health. When you cook, it's best to use oils with healthier fats, such as olive oil and canola oil.  Sugar - To have a healthy diet, it's important to limit or avoid sugar, sweets, and refined grains. Refined grains are found in white bread, white rice, most forms of pasta, and most packaged "snack" foods. Whole grains, such as whole-wheat bread and brown rice, have more fiber and are better for your health.  Avoiding sugar-sweetened beverages, like soda and sports drinks, can also help improve your health.  Red meat - Studies have shown that eating a lot of red meat can increase your " "risk of certain health problems, including heart disease and cancer. You should limit the amount of red meat that you eat.  Can I drink alcohol as part of a healthy diet? -- People who drink a small amount of alcohol each day might have a lower chance of getting heart disease. But drinking alcohol can lead to problems. For example, it can raise a person's chances of getting liver disease and certain types of cancers. In women, even 1 drink a day can increase the risk of getting breast cancer.  Most doctors recommend that adult women not have more than 1 drink a day and that adult men not have more than 2 drinks a day. The limits are different because most women's bodies take longer to break down alcohol.  How many calories do I need each day? -- The number of calories you need each day depends on your weight, height, age, sex, and how active you are.  Your doctor or nurse can tell you how many calories you should eat each day. If you are trying to lose weight, you should eat fewer calories each day.  What if I have questions? -- If you have questions about which foods you should or should not eat, ask your doctor or nurse. The right diet for you will depend, in part, on your health and any medical conditions you have.  All topics are updated as new evidence becomes available and our peer review process is complete.  This topic retrieved from [employer] on: Sep 21, 2021.  Topic 24070 Version 20.0  Release: 29.4.2 - C29.263  © 2021 UpToDate, Inc. and/or its affiliates. All rights reserved.  figure 1: Nutrition label - fiber     This is an example of a nutrition label. To figure out how much fiber is in a food, look for the line that says "Dietary Fiber." It's also important to look at the serving size. This food has 7 grams of fiber in each serving, and each serving is 1 cup.  Graphic 16100 Version 7.0    figure 2: Foods and drinks with calcium and vitamin D     Foods rich in calcium include ice cream, soy milk, breads, " "kale, broccoli, milk, cheese, cottage cheese, almonds, yogurt, ready-to-eat cereals, beans, and tofu. Foods rich in vitamin D include milk, fortified plant-based "milks" (soy, almond), canned tuna fish, cod liver oil, yogurt, ready-to-eat-cereals, cooked salmon, canned sardines, mackerel, and eggs. Some of these foods are rich in both.  Graphic 81441 Version 4.0    Consumer Information Use and Disclaimer   This information is not specific medical advice and does not replace information you receive from your health care provider. This is only a brief summary of general information. It does NOT include all information about conditions, illnesses, injuries, tests, procedures, treatments, therapies, discharge instructions or life-style choices that may apply to you. You must talk with your health care provider for complete information about your health and treatment options. This information should not be used to decide whether or not to accept your health care provider's advice, instructions or recommendations. Only your health care provider has the knowledge and training to provide advice that is right for you. The use of this information is governed by the [employer] End User License Agreement, available at https://www.YesWeAd.SeekSherpa/en/solutions/NVELO/about/jacinda.The use of [employer] content is governed by the [employer] Terms of Use. ©2021 UpToDate, Inc. All rights reserved.  Copyright   © 2021 UpToDate, Inc. and/or its affiliates. All rights reserved.    "

## 2023-11-08 ENCOUNTER — PATIENT OUTREACH (OUTPATIENT)
Dept: ADMINISTRATIVE | Facility: HOSPITAL | Age: 43
End: 2023-11-08

## 2023-11-08 LAB
CHOLEST SERPL-MCNC: 166 MG/DL (ref 0–200)
CHOLEST/HDLC SERPL: 2.6 {RATIO}
GLUCOSE SERPL-MCNC: 96 MG/DL (ref 74–106)
HDLC SERPL-MCNC: 63 MG/DL (ref 40–60)
LDLC SERPL CALC-MCNC: 92 MG/DL
LDLC/HDLC SERPL: 1.5 {RATIO}
NONHDLC SERPL-MCNC: 103 MG/DL
TRIGL SERPL-MCNC: 55 MG/DL (ref 35–150)
VLDLC SERPL-MCNC: 11 MG/DL

## 2023-11-08 NOTE — PROGRESS NOTES
Population Health Chart Review & Patient Outreach Details      Further Action Needed If Patient Returns Outreach:            Updates Requested / Reviewed:     []  Care Everywhere    []     []  External Sources ([facility], [facility], [facility], etc.)    [] [facility]   [] [facility]   [] Other:    []  Care Team Updated   []  Removed  or Duplicate Orders   []  Immunization Reconciliation Completed / Queried    [] [place]   [] [place]   [] [place]   [] [place]      Health Maintenance Topics Addressed and Outreach Outcomes / Actions Taken:             Breast Cancer Screening []  Mammogram Order Placed    []  Mammogram Screening Scheduled    []  External Records Requested & Care Team Updated if Applicable    []  External Records Uploaded & Care Team Updated if Applicable    []  Pt Declined Scheduling Mammogram    []  Pt Will Schedule with External Provider / Order Routed & Care Team Updated if Applicable              Cervical Cancer Screening []  Pap Smear Scheduled in Primary Care or OBGYN    []  External Records Requested & Care Team Updated if Applicable       []  External Records Uploaded, Care Team Updated, & History Updated if Applicable    []  Patient Declined Scheduling Pap Smear    []  Patient Will Schedule with External Provider & Care Team Updated if Applicable                  Colorectal Cancer Screening []  Colonoscopy Case Request / Referral / Home Test Order Placed    []  External Records Requested & Care Team Updated if Applicable    []  External Records Uploaded, Care Team Updated, & History Updated if Applicable    []  Patient Declined Completing Colon Cancer Screening    []  Patient Will Schedule with External Provider & Care Team Updated if Applicable    []  Fit Kit Mailed (add the SmartPhrase under additional notes)    []  Reminded Patient to Complete Home Test                Diabetic Eye Exam []  Eye Exam Screening Order Placed    []  Eye Camera Scheduled or Optometry/Ophthalmology Referral  Placed    []  External Records Requested & Care Team Updated if Applicable    []  External Records Uploaded, Care Team Updated, & History Updated if Applicable    []  Patient Declined Scheduling Eye Exam    []  Patient Will Schedule with External Provider & Care Team Updated if Applicable             Blood Pressure Control []  Primary Care Follow Up Visit Scheduled     []  Remote Blood Pressure Reading Captured    []  Patient Declined Remote Reading or Scheduling Appt - Escalated to PCP    []  Patient Will Call Back or Send Portal Message with Reading                 HbA1c & Other Labs []  Overdue Lab(s) Ordered    []  Overdue Lab(s) Scheduled    []  External Records Uploaded & Care Team Updated if Applicable    []  Primary Care Follow Up Visit Scheduled     []  Reminded Patient to Complete A1c Home Test    []  Patient Declined Scheduling Labs or Will Call Back to Schedule    []  Patient Will Schedule with External Provider / Order Routed, & Care Team Updated if Applicable           Primary Care Appointment []  Primary Care Appt Scheduled    []  Patient Declined Scheduling or Will Call Back to Schedule    []  Pt Established with External Provider, Updated Care Team, & Informed Pt to Notify Payor if Applicable           Medication Adherence /    Statin Use []  Primary Care Appointment Scheduled    []  Patient Reminded to  Prescription    []  Patient Declined, Provider Notified if Needed    []  Sent Provider Message to Review to Evaluate Pt for Statin, Add Exclusion Dx Codes, Document   Exclusion in Problem List, Change Statin Intensity Level to Moderate or High Intensity if Applicable                Osteoporosis Screening []  Dexa Order Placed    []  Dexa Appointment Scheduled    []  External Records Requested & Care Team Updated    []  External Records Uploaded, Care Team Updated, & History Updated if Applicable    []  Patient Declined Scheduling Dexa or Will Call Back to Schedule    []  Patient Will Schedule  with External Provider / Order Routed & Care Team Updated if Applicable       Additional Notes:.  Post visit Population Health review of encounter with date of service  11/7/2023 with Kiara. Not  All required HY components in encounter. Needs wellness plan and message sent to provider's staff via staff message and chart is open. Mariela   Followup appt for: Pt needs appt for 2024 HY sent to [facility] to schedule via one note .

## 2024-03-12 RX ORDER — DROSPIRENONE 4 MG/1
4 TABLET, FILM COATED ORAL
Qty: 28 TABLET | OUTPATIENT
Start: 2024-03-12

## 2024-03-19 ENCOUNTER — OFFICE VISIT (OUTPATIENT)
Dept: OBSTETRICS AND GYNECOLOGY | Facility: CLINIC | Age: 44
End: 2024-03-19
Payer: COMMERCIAL

## 2024-03-19 VITALS
DIASTOLIC BLOOD PRESSURE: 98 MMHG | HEART RATE: 85 BPM | WEIGHT: 180.19 LBS | SYSTOLIC BLOOD PRESSURE: 140 MMHG | BODY MASS INDEX: 29.99 KG/M2

## 2024-03-19 DIAGNOSIS — Z12.4 CERVICAL CANCER SCREENING: ICD-10-CM

## 2024-03-19 DIAGNOSIS — Z72.51 HIGH RISK SEXUAL BEHAVIOR, UNSPECIFIED TYPE: ICD-10-CM

## 2024-03-19 DIAGNOSIS — Z30.011 ENCOUNTER FOR INITIAL PRESCRIPTION OF CONTRACEPTIVE PILLS: ICD-10-CM

## 2024-03-19 DIAGNOSIS — Z11.3 SCREEN FOR STD (SEXUALLY TRANSMITTED DISEASE): ICD-10-CM

## 2024-03-19 DIAGNOSIS — Z11.4 ENCOUNTER FOR SCREENING FOR HIV: ICD-10-CM

## 2024-03-19 DIAGNOSIS — Z12.31 ENCOUNTER FOR SCREENING MAMMOGRAM FOR HIGH-RISK PATIENT: ICD-10-CM

## 2024-03-19 DIAGNOSIS — F39 MOOD DISORDER: ICD-10-CM

## 2024-03-19 DIAGNOSIS — Z01.419 ROUTINE GYNECOLOGICAL EXAMINATION: Primary | ICD-10-CM

## 2024-03-19 PROCEDURE — 1159F MED LIST DOCD IN RCRD: CPT | Mod: ,,, | Performed by: OBSTETRICS & GYNECOLOGY

## 2024-03-19 PROCEDURE — 3077F SYST BP >= 140 MM HG: CPT | Mod: ,,, | Performed by: OBSTETRICS & GYNECOLOGY

## 2024-03-19 PROCEDURE — 99396 PREV VISIT EST AGE 40-64: CPT | Mod: ,,, | Performed by: OBSTETRICS & GYNECOLOGY

## 2024-03-19 PROCEDURE — 3080F DIAST BP >= 90 MM HG: CPT | Mod: ,,, | Performed by: OBSTETRICS & GYNECOLOGY

## 2024-03-19 PROCEDURE — 3008F BODY MASS INDEX DOCD: CPT | Mod: ,,, | Performed by: OBSTETRICS & GYNECOLOGY

## 2024-03-19 RX ORDER — ESCITALOPRAM OXALATE 10 MG/1
10 TABLET ORAL DAILY
Qty: 30 TABLET | Refills: 11 | Status: SHIPPED | OUTPATIENT
Start: 2024-03-19 | End: 2025-03-19

## 2024-03-19 RX ORDER — DROSPIRENONE 4 MG/1
1 TABLET, FILM COATED ORAL DAILY
Qty: 30 TABLET | Refills: 11 | Status: SHIPPED | OUTPATIENT
Start: 2024-03-19 | End: 2025-03-19

## 2024-03-19 NOTE — PROGRESS NOTES
CC: Well woman exam    Jw Martins is a 43 y.o. female  presents for well woman exam.    LMP: Patient's last menstrual period was 2024 (approximate)..    Last mammogram: 03/15/2022  Last Colonoscopy: N/A    Patient complains of hormonal changes with mood swings and irregular cycles. Patient states she wants to start birth control again.     Past Medical History:   Diagnosis Date    Hypertension      Past Surgical History:   Procedure Laterality Date    DILATION AND CURETTAGE OF UTERUS      Surgery done in [place]    REDUCTION OF BOTH BREASTS       Social History     Socioeconomic History    Marital status:    Tobacco Use    Smoking status: Never     Passive exposure: Never    Smokeless tobacco: Never   Substance and Sexual Activity    Drug use: Never     Family History   Problem Relation Age of Onset    Hypertension Mother     Hypertension Father     Diabetes Father     Hypertension Maternal Grandmother     Hypertension Maternal Grandfather     Diabetes Maternal Grandfather     Hypertension Paternal Grandmother     Hypertension Paternal Grandfather      OB History          3    Para   1    Term   1            AB   1    Living   1         SAB   1    IAB        Ectopic        Multiple        Live Births   1                 BP (!) 140/98   Pulse 85   Wt 81.7 kg (180 lb 3.2 oz)   LMP 2024 (Approximate)   BMI 29.99 kg/m²       ROS:  GENERAL: Denies weight gain or weight loss. Feeling well overall.   SKIN: Denies rash or lesions.   HEAD: Denies head injury or headache.   NODES: Denies enlarged lymph nodes.   CHEST: Denies chest pain or shortness of breath.   CARDIOVASCULAR: Denies palpitations or left sided chest pain.   ABDOMEN: No abdominal pain, constipation, diarrhea, nausea, vomiting or rectal bleeding.   URINARY: No frequency, dysuria, hematuria, or burning on urination.  REPRODUCTIVE: See HPI.   BREASTS: The patient performs breast self-examination and denies  pain, lumps, or nipple discharge.   HEMATOLOGIC: No easy bruisability or excessive bleeding.   MUSCULOSKELETAL: Denies joint pain or swelling.   NEUROLOGIC: Denies syncope or weakness.   PSYCHIATRIC: Denies depression, anxiety or mood swings.    PHYSICAL EXAM:  APPEARANCE: Well nourished, well developed, in no acute distress.  AFFECT: WNL, alert and oriented x 3  SKIN: No acne or hirsutism  NECK: Neck symmetric without masses or thyromegaly  NODES: No inguinal, cervical, axillary, or femoral lymph node enlargement  CHEST: Good respiratory effect  ABDOMEN: Soft.  No tenderness or masses.  No hepatosplenomegaly.  No hernias.  BREASTS: Symmetrical, no skin changes or visible lesions.  No palpable masses, nipple discharge bilaterally.  PELVIC: Normal external genitalia without lesions.  Normal hair distribution.  Adequate perineal body, normal urethral meatus.  Vagina moist and well rugated without lesions or discharge.  Cervix pink, without lesions, discharge or tenderness.  No significant cystocele or rectocele.  Bimanual exam shows uterus to be normal size, regular, mobile and nontender.  Adnexa without masses or tenderness.    EXTREMITIES: No edema.    Routine gynecological examination    Cervical cancer screening    Screen for STD (sexually transmitted disease)    High risk sexual behavior, unspecified type    Encounter for screening for HIV    Encounter for screening mammogram for high-risk patient  -     Mammo Digital Screening Bilat; Future; Expected date: 03/19/2024    Encounter for initial prescription of contraceptive pills  -     SLYND 4 mg (28) Tab; Take 1 tablet (4 mg total) by mouth once daily.  Dispense: 30 tablet; Refill: 11    Mood disorder  -     EScitalopram oxalate (LEXAPRO) 10 MG tablet; Take 1 tablet (10 mg total) by mouth once daily.  Dispense: 30 tablet; Refill: 11            Patient was counseled today on A.C.S. Pap guidelines and recommendations for yearly pelvic exams, mammograms and monthly  self breast exams; to see her PCP for other health maintenance.   Exercise regimen encouraged  Healthy food choices encouraged  Questions answered to desired level of satisfaction  Verbalized understanding to all information and instructions    Follow up in about 1 year (around 3/19/2025) for Annual in 1 year.      Charlie Arenas M.D., FCOG    OB/GYN

## 2024-04-18 ENCOUNTER — TELEPHONE (OUTPATIENT)
Dept: OBSTETRICS AND GYNECOLOGY | Facility: CLINIC | Age: 44
End: 2024-04-18

## 2024-04-18 NOTE — TELEPHONE ENCOUNTER
----- Message from Radha Valera sent at 4/15/2024  2:14 PM CDT -----  2nd call - pt needs PA for her BC to be filled by her insurance [phone]

## 2024-04-18 NOTE — TELEPHONE ENCOUNTER
Patient called about medication not being filled through insurance due to them not wanting to pay/ Started PA on medication since patient states her insurance was paying in the past.

## 2024-04-18 NOTE — TELEPHONE ENCOUNTER
----- Message from Radha Valera sent at 4/11/2024  3:18 PM CDT -----  Pt needs her insurance company contacted regarding authorizing payment for her BC that she has been taking for a long time and now they are not approving and asking her to take a generic, so she will need the Doctor to tell them that it can be approved.  [facility] [phone] dept of authorization auth code# [id number]

## 2024-08-19 ENCOUNTER — OFFICE VISIT (OUTPATIENT)
Dept: FAMILY MEDICINE | Facility: CLINIC | Age: 44
End: 2024-08-19
Payer: COMMERCIAL

## 2024-08-19 VITALS
SYSTOLIC BLOOD PRESSURE: 124 MMHG | HEIGHT: 65 IN | BODY MASS INDEX: 30.66 KG/M2 | RESPIRATION RATE: 16 BRPM | WEIGHT: 184 LBS | HEART RATE: 70 BPM | OXYGEN SATURATION: 99 % | DIASTOLIC BLOOD PRESSURE: 82 MMHG | TEMPERATURE: 98 F

## 2024-08-19 DIAGNOSIS — M25.50 ARTHRALGIA, UNSPECIFIED JOINT: ICD-10-CM

## 2024-08-19 DIAGNOSIS — Z12.31 ENCOUNTER FOR SCREENING MAMMOGRAM FOR MALIGNANT NEOPLASM OF BREAST: ICD-10-CM

## 2024-08-19 DIAGNOSIS — I10 ESSENTIAL HYPERTENSION: Primary | ICD-10-CM

## 2024-08-19 DIAGNOSIS — R53.83 FATIGUE, UNSPECIFIED TYPE: ICD-10-CM

## 2024-08-19 LAB
25(OH)D3 SERPL-MCNC: 38.4 NG/ML
ALBUMIN SERPL BCP-MCNC: 3.9 G/DL (ref 3.5–5)
ALBUMIN/GLOB SERPL: 1 {RATIO}
ALP SERPL-CCNC: 78 U/L (ref 37–98)
ALT SERPL W P-5'-P-CCNC: 20 U/L (ref 13–56)
ANION GAP SERPL CALCULATED.3IONS-SCNC: 10 MMOL/L (ref 7–16)
AST SERPL W P-5'-P-CCNC: 16 U/L (ref 15–37)
BASOPHILS # BLD AUTO: 0.04 K/UL (ref 0–0.2)
BASOPHILS NFR BLD AUTO: 0.6 % (ref 0–1)
BILIRUB SERPL-MCNC: 1.3 MG/DL (ref ?–1.2)
BUN SERPL-MCNC: 17 MG/DL (ref 7–18)
BUN/CREAT SERPL: 17 (ref 6–20)
CALCIUM SERPL-MCNC: 9 MG/DL (ref 8.5–10.1)
CHLORIDE SERPL-SCNC: 108 MMOL/L (ref 98–107)
CO2 SERPL-SCNC: 25 MMOL/L (ref 21–32)
CREAT SERPL-MCNC: 1 MG/DL (ref 0.55–1.02)
CRP SERPL-MCNC: <0.29 MG/DL (ref 0–0.8)
DIFFERENTIAL METHOD BLD: ABNORMAL
EGFR (NO RACE VARIABLE) (RUSH/TITUS): 72 ML/MIN/1.73M2
EOSINOPHIL # BLD AUTO: 0.06 K/UL (ref 0–0.5)
EOSINOPHIL NFR BLD AUTO: 0.9 % (ref 1–4)
ERYTHROCYTE [DISTWIDTH] IN BLOOD BY AUTOMATED COUNT: 12.7 % (ref 11.5–14.5)
ERYTHROCYTE [SEDIMENTATION RATE] IN BLOOD BY WESTERGREN METHOD: 3 MM/HR (ref 0–20)
GLOBULIN SER-MCNC: 3.8 G/DL (ref 2–4)
GLUCOSE SERPL-MCNC: 89 MG/DL (ref 74–106)
HCT VFR BLD AUTO: 43.6 % (ref 38–47)
HGB BLD-MCNC: 14.4 G/DL (ref 12–16)
IMM GRANULOCYTES # BLD AUTO: 0.02 K/UL (ref 0–0.04)
IMM GRANULOCYTES NFR BLD: 0.3 % (ref 0–0.4)
LYMPHOCYTES # BLD AUTO: 1.8 K/UL (ref 1–4.8)
LYMPHOCYTES NFR BLD AUTO: 25.9 % (ref 27–41)
MCH RBC QN AUTO: 27.1 PG (ref 27–31)
MCHC RBC AUTO-ENTMCNC: 33 G/DL (ref 32–36)
MCV RBC AUTO: 82.1 FL (ref 80–96)
MONOCYTES # BLD AUTO: 0.54 K/UL (ref 0–0.8)
MONOCYTES NFR BLD AUTO: 7.8 % (ref 2–6)
MPC BLD CALC-MCNC: 9.9 FL (ref 9.4–12.4)
NEUTROPHILS # BLD AUTO: 4.49 K/UL (ref 1.8–7.7)
NEUTROPHILS NFR BLD AUTO: 64.5 % (ref 53–65)
NRBC # BLD AUTO: 0 X10E3/UL
NRBC, AUTO (.00): 0 %
PLATELET # BLD AUTO: 345 K/UL (ref 150–400)
POTASSIUM SERPL-SCNC: 4.1 MMOL/L (ref 3.5–5.1)
PROT SERPL-MCNC: 7.7 G/DL (ref 6.4–8.2)
RBC # BLD AUTO: 5.31 M/UL (ref 4.2–5.4)
RHEUMATOID FACT SER NEPH-ACNC: NEGATIVE [IU]/ML
SODIUM SERPL-SCNC: 139 MMOL/L (ref 136–145)
TSH SERPL DL<=0.005 MIU/L-ACNC: 0.69 UIU/ML (ref 0.36–3.74)
WBC # BLD AUTO: 6.95 K/UL (ref 4.5–11)

## 2024-08-19 PROCEDURE — 86430 RHEUMATOID FACTOR TEST QUAL: CPT | Mod: ,,, | Performed by: CLINICAL MEDICAL LABORATORY

## 2024-08-19 PROCEDURE — 3079F DIAST BP 80-89 MM HG: CPT | Mod: ,,, | Performed by: FAMILY MEDICINE

## 2024-08-19 PROCEDURE — 86038 ANTINUCLEAR ANTIBODIES: CPT | Mod: ,,, | Performed by: CLINICAL MEDICAL LABORATORY

## 2024-08-19 PROCEDURE — 86200 CCP ANTIBODY: CPT | Mod: ,,, | Performed by: CLINICAL MEDICAL LABORATORY

## 2024-08-19 PROCEDURE — 1160F RVW MEDS BY RX/DR IN RCRD: CPT | Mod: ,,, | Performed by: FAMILY MEDICINE

## 2024-08-19 PROCEDURE — 3074F SYST BP LT 130 MM HG: CPT | Mod: ,,, | Performed by: FAMILY MEDICINE

## 2024-08-19 PROCEDURE — 80050 GENERAL HEALTH PANEL: CPT | Mod: ,,, | Performed by: CLINICAL MEDICAL LABORATORY

## 2024-08-19 PROCEDURE — 85651 RBC SED RATE NONAUTOMATED: CPT | Mod: ,,, | Performed by: CLINICAL MEDICAL LABORATORY

## 2024-08-19 PROCEDURE — 3008F BODY MASS INDEX DOCD: CPT | Mod: ,,, | Performed by: FAMILY MEDICINE

## 2024-08-19 PROCEDURE — 86140 C-REACTIVE PROTEIN: CPT | Mod: ,,, | Performed by: CLINICAL MEDICAL LABORATORY

## 2024-08-19 PROCEDURE — G2211 COMPLEX E/M VISIT ADD ON: HCPCS | Mod: ,,, | Performed by: FAMILY MEDICINE

## 2024-08-19 PROCEDURE — 82306 VITAMIN D 25 HYDROXY: CPT | Mod: ,,, | Performed by: CLINICAL MEDICAL LABORATORY

## 2024-08-19 PROCEDURE — 99214 OFFICE O/P EST MOD 30 MIN: CPT | Mod: ,,, | Performed by: FAMILY MEDICINE

## 2024-08-19 PROCEDURE — 1159F MED LIST DOCD IN RCRD: CPT | Mod: ,,, | Performed by: FAMILY MEDICINE

## 2024-08-19 RX ORDER — AMLODIPINE BESYLATE 5 MG/1
5 TABLET ORAL DAILY
Qty: 90 TABLET | Refills: 1 | Status: SHIPPED | OUTPATIENT
Start: 2024-08-19

## 2024-08-19 NOTE — PROGRESS NOTES
[facility]    Chief Complaint      Chief Complaint   Patient presents with    Fatigue     Since Feb 2024, no snoring that she knows of, states sleeps fine but feels like not rested.    joint pain     Worse in morning gets slightly better as the day goes on, x few years but last couple months has gotten worse, supplements (tumeric) helps some but stopped to see how she would feel without it.    Hypertension     Ran out of medication, been out for 1mo     headaches     Every other day. Since the past month. Vision not affected. Nausea with headaches. Back of head and temples.        History of Present Illness      Jw Martins is a 43 y.o. female that  has a past medical history of Hypertension.     HPI    The patient presents today for medication refills for hypertension.         Past Medical History:  Past Medical History:   Diagnosis Date    Hypertension        Past Surgical History:   has a past surgical history that includes Dilation and curettage of uterus (2011) and Reduction of both breasts.    Social History:  Social History     Tobacco Use    Smoking status: Never     Passive exposure: Never    Smokeless tobacco: Never   Substance Use Topics    Drug use: Never       I personally reviewed all past medical, surgical, and social.     Review of Systems   Constitutional:  Positive for malaise/fatigue. Negative for chills and fever.   HENT:  Negative for ear pain and sore throat.    Eyes:  Negative for blurred vision.   Respiratory:  Negative for cough and shortness of breath.    Cardiovascular:  Negative for chest pain and palpitations.   Gastrointestinal:  Negative for abdominal pain and constipation.   Genitourinary:  Negative for dysuria and hematuria.   Musculoskeletal:  Positive for joint pain. Negative for back pain and falls.   Skin:  Negative for itching and rash.   Neurological:  Positive for headaches (The patient states that she has been out of amlodipine for approximately a month.   "The headache started after she ran out of her medication.  Her blood pressure is in the 130s over 80s when she has a headache.). Negative for weakness.   Endo/Heme/Allergies:  Negative for polydipsia. Does not bruise/bleed easily.   Psychiatric/Behavioral:  Negative for suicidal ideas. The patient does not have insomnia.         Medications:  Outpatient Encounter Medications as of 8/19/2024   Medication Sig Dispense Refill    EScitalopram oxalate (LEXAPRO) 10 MG tablet Take 1 tablet (10 mg total) by mouth once daily. 30 tablet 11    SLYND 4 mg (28) Tab Take 1 tablet (4 mg total) by mouth once daily. 30 tablet 11    [DISCONTINUED] amLODIPine (NORVASC) 10 MG tablet Take 1 tablet (10 mg total) by mouth once daily. 90 tablet 1    amLODIPine (NORVASC) 5 MG tablet Take 1 tablet (5 mg total) by mouth once daily. 90 tablet 1     No facility-administered encounter medications on file as of 8/19/2024.       Allergies:  Review of patient's allergies indicates:  No Known Allergies    Health Maintenance:  Immunization History   Administered Date(s) Administered    COVID-19, MRNA, LN-S, PF (MODERNA FULL 0.5 ML DOSE) 01/04/2021, 02/01/2021    Influenza 11/03/2021    Influenza - Quadrivalent - PF *Preferred* (6 months and older) 11/07/2023    Td - PF (ADULT) 07/19/2000      Health Maintenance   Topic Date Due    TETANUS VACCINE  07/19/2010    Mammogram  03/15/2023    Lipid Panel  Completed    Hepatitis C Screening  Addressed        Physical Exam      Vital Signs  Temp: 97.5 °F (36.4 °C)  Temp Source: Temporal  Pulse: 70  Resp: 16  SpO2: 99 %  BP: 124/82  Pain Score:   5  Pain Loc: Generalized  Height and Weight  Height: 5' 5" (165.1 cm)  Weight: 83.5 kg (184 lb)  BSA (Calculated - sq m): 1.96 sq meters  BMI (Calculated): 30.6  Weight in (lb) to have BMI = 25: 149.9]    Physical Exam  Vitals and nursing note reviewed.   Constitutional:       Appearance: Normal appearance.   HENT:      Head: Normocephalic and atraumatic.      Nose: " Nose normal.   Eyes:      Extraocular Movements: Extraocular movements intact.      Conjunctiva/sclera: Conjunctivae normal.      Pupils: Pupils are equal, round, and reactive to light.   Cardiovascular:      Rate and Rhythm: Normal rate and regular rhythm.      Heart sounds: Normal heart sounds.   Pulmonary:      Effort: Pulmonary effort is normal.      Breath sounds: Normal breath sounds.   Musculoskeletal:      Right lower leg: No edema.      Left lower leg: No edema.   Skin:     Findings: No rash.   Neurological:      General: No focal deficit present.      Mental Status: She is alert and oriented to person, place, and time. Mental status is at baseline.      Cranial Nerves: No cranial nerve deficit.      Gait: Gait normal.   Psychiatric:         Mood and Affect: Mood normal.         Behavior: Behavior normal.         Thought Content: Thought content normal.         Judgment: Judgment normal.          Laboratory:  CBC:  Recent Labs   Lab 10/11/21  1450   WBC 7.19   RBC 5.12   Hemoglobin 14.0   Hematocrit 42.7   Platelet Count 378   MCV 83.4   MCH 27.3   MCHC 32.8     CMP:  Recent Labs   Lab 10/11/21  1450 04/18/23  0934   Glucose 106 91   Calcium 9.4 9.0   Albumin 3.8  --    Total Protein 7.2  --    Sodium 140 139   Potassium 4.1 4.8   CO2 28 27   Chloride 107 107   BUN 15 21 H   Alk Phos 63  --    ALT 18  --    AST 15  --    Bilirubin, Total 1.1  --      LIPIDS:  Recent Labs   Lab 10/11/21  1450 06/30/22  1013 11/07/23  1039   TSH 0.451  --   --    HDL Cholesterol  --  43 63 H   Cholesterol  --  136 166   Triglycerides  --  76 55   LDL Calculated  --  78 92   Cholesterol/HDL Ratio (Risk Factor)  --  3.2 2.6   Non-HDL  --  93 103     TSH:  Recent Labs   Lab 10/11/21  1450   TSH 0.451     A1C:  Recent Labs   Lab 06/30/22  1013 11/07/23  1039   Hemoglobin A1C 5.6 5.5       Assessment/Plan     Jw Martins is a 43 y.o.female with:    1. Essential hypertension  Assessment & Plan:  Decrease amlodipine to 5 mg  daily.    Orders:  -     Comprehensive Metabolic Panel; Future; Expected date: 08/19/2024  -     amLODIPine (NORVASC) 5 MG tablet; Take 1 tablet (5 mg total) by mouth once daily.  Dispense: 90 tablet; Refill: 1    2. Fatigue, unspecified type  -     CBC Auto Differential; Future; Expected date: 08/19/2024  -     Vitamin D; Future; Expected date: 08/19/2024  -     TSH; Future; Expected date: 08/19/2024    3. Arthralgia, unspecified joint  -     Comprehensive Metabolic Panel; Future; Expected date: 08/19/2024  -     OLINDA EIA w/ Reflex to dsDNA/VIRGEN; Future; Expected date: 08/19/2024  -     Rheumatoid Factor Screen; Future; Expected date: 08/19/2024  -     Cyclic Citrullinated Peptide Antibody, IgG; Future; Expected date: 08/19/2024  -     Sedimentation Rate; Future; Expected date: 08/19/2024  -     C-Reactive Protein; Future; Expected date: 08/19/2024    4. Encounter for screening mammogram for malignant neoplasm of breast  -     Mammo Digital Screening Bilat; Future; Expected date: 08/19/2024        Visit today included increased complexity associated with managing the longitudinal care of the patient due to the serious and/or complex managed problem(s) of  hypertension.     Chronic conditions status updated as per HPI.  Other than changes above, cont current medications and maintain follow up with specialists.  Return to clinic in 3 month(s) for medication refills.    Nasrin Bolivar DO  [facility]

## 2024-08-20 LAB — ANA SER QL: NEGATIVE

## 2024-08-22 LAB — CCP AB SER IA-ACNC: <16 UNITS

## 2024-09-23 ENCOUNTER — TELEPHONE (OUTPATIENT)
Dept: FAMILY MEDICINE | Facility: CLINIC | Age: 44
End: 2024-09-23
Payer: COMMERCIAL

## 2024-09-23 NOTE — TELEPHONE ENCOUNTER
Notified patient of results. Patient voiced understanding.  Patient stated that she does have abdominal pain every now and again.

## 2024-09-23 NOTE — TELEPHONE ENCOUNTER
----- Message from Nasrin Bolivar DO sent at 9/9/2024  1:49 PM CDT -----  The patient's vitamin-D is low normal.  Take over-the-counter vitamin-D 2000 units daily.  Her bilirubin is slightly elevated.  Is the patient having any abdominal pain?

## 2025-02-10 ENCOUNTER — OFFICE VISIT (OUTPATIENT)
Dept: FAMILY MEDICINE | Facility: CLINIC | Age: 45
End: 2025-02-10
Payer: COMMERCIAL

## 2025-02-10 VITALS
OXYGEN SATURATION: 98 % | BODY MASS INDEX: 31.99 KG/M2 | HEIGHT: 65 IN | DIASTOLIC BLOOD PRESSURE: 82 MMHG | TEMPERATURE: 98 F | SYSTOLIC BLOOD PRESSURE: 128 MMHG | HEART RATE: 92 BPM | WEIGHT: 192 LBS

## 2025-02-10 DIAGNOSIS — R09.81 CONGESTED NOSE: ICD-10-CM

## 2025-02-10 DIAGNOSIS — J32.9 SINUSITIS, UNSPECIFIED CHRONICITY, UNSPECIFIED LOCATION: Primary | ICD-10-CM

## 2025-02-10 LAB
CTP QC/QA: YES
CTP QC/QA: YES
POC MOLECULAR INFLUENZA A AGN: NEGATIVE
POC MOLECULAR INFLUENZA B AGN: NEGATIVE
SARS-COV-2 RDRP RESP QL NAA+PROBE: NEGATIVE

## 2025-02-10 PROCEDURE — 99213 OFFICE O/P EST LOW 20 MIN: CPT | Mod: 25,,, | Performed by: FAMILY MEDICINE

## 2025-02-10 PROCEDURE — 1159F MED LIST DOCD IN RCRD: CPT | Mod: ,,, | Performed by: FAMILY MEDICINE

## 2025-02-10 PROCEDURE — 3008F BODY MASS INDEX DOCD: CPT | Mod: ,,, | Performed by: FAMILY MEDICINE

## 2025-02-10 PROCEDURE — 3074F SYST BP LT 130 MM HG: CPT | Mod: ,,, | Performed by: FAMILY MEDICINE

## 2025-02-10 PROCEDURE — 87635 SARS-COV-2 COVID-19 AMP PRB: CPT | Mod: QW,,, | Performed by: FAMILY MEDICINE

## 2025-02-10 PROCEDURE — 87502 INFLUENZA DNA AMP PROBE: CPT | Mod: QW,,, | Performed by: FAMILY MEDICINE

## 2025-02-10 PROCEDURE — 1160F RVW MEDS BY RX/DR IN RCRD: CPT | Mod: ,,, | Performed by: FAMILY MEDICINE

## 2025-02-10 PROCEDURE — 3079F DIAST BP 80-89 MM HG: CPT | Mod: ,,, | Performed by: FAMILY MEDICINE

## 2025-02-10 PROCEDURE — 96372 THER/PROPH/DIAG INJ SC/IM: CPT | Mod: ,,, | Performed by: FAMILY MEDICINE

## 2025-02-10 RX ORDER — PREDNISONE 20 MG/1
20 TABLET ORAL DAILY
Qty: 5 TABLET | Refills: 0 | Status: SHIPPED | OUTPATIENT
Start: 2025-02-10 | End: 2025-02-15

## 2025-02-10 RX ORDER — DEXAMETHASONE SODIUM PHOSPHATE 4 MG/ML
4 INJECTION, SOLUTION INTRA-ARTICULAR; INTRALESIONAL; INTRAMUSCULAR; INTRAVENOUS; SOFT TISSUE
Status: COMPLETED | OUTPATIENT
Start: 2025-02-10 | End: 2025-02-10

## 2025-02-10 RX ORDER — AMOXICILLIN AND CLAVULANATE POTASSIUM 875; 125 MG/1; MG/1
1 TABLET, FILM COATED ORAL 2 TIMES DAILY
Qty: 14 TABLET | Refills: 0 | Status: SHIPPED | OUTPATIENT
Start: 2025-02-10 | End: 2025-02-17

## 2025-02-10 RX ADMIN — DEXAMETHASONE SODIUM PHOSPHATE 4 MG: 4 INJECTION, SOLUTION INTRA-ARTICULAR; INTRALESIONAL; INTRAMUSCULAR; INTRAVENOUS; SOFT TISSUE at 03:02

## 2025-02-10 NOTE — LETTER
February 10, 2025      [facility]  [address]  [place] [address]  Phone: [phone]  Fax: [phone]       Patient: Jw Martins   YOB: 1980  Date of Visit: 02/10/2025    To Whom It May Concern:    ALEXIS Martins  was at [facility] on 02/10/2025. The patient may return to work/school on 02/12/2025 with no restrictions. If you have any questions or concerns, or if I can be of further assistance, please do not hesitate to contact me.    Sincerely,    Baldomero Levin II, DO

## 2025-02-10 NOTE — PROGRESS NOTES
Subjective:       Patient ID: Jw Martins is a 44 y.o. female.    Chief Complaint: Headache and Sinus Problem (Lose of taste today.)    Headache   Associated symptoms include rhinorrhea and sinus pressure. Pertinent negatives include no abdominal pain, back pain, coughing, dizziness, ear pain, fever, hearing loss, nausea, neck pain, numbness, photophobia, seizures, sore throat, tinnitus, vomiting or weakness.   Sinus Problem  Associated symptoms include congestion, headaches and sinus pressure. Pertinent negatives include no chills, coughing, diaphoresis, ear pain, neck pain, shortness of breath, sneezing or sore throat.     Review of Systems   Constitutional:  Negative for activity change, appetite change, chills, diaphoresis, fatigue, fever and unexpected weight change.   HENT:  Positive for nasal congestion, postnasal drip, rhinorrhea and sinus pressure/congestion. Negative for dental problem, drooling, ear discharge, ear pain, facial swelling, hearing loss, mouth sores, nosebleeds, sneezing, sore throat, tinnitus, trouble swallowing, voice change and goiter.    Eyes:  Negative for photophobia, discharge, itching and visual disturbance.   Respiratory:  Negative for apnea, cough, choking, chest tightness, shortness of breath, wheezing and stridor.    Cardiovascular:  Negative for chest pain, palpitations, leg swelling and claudication.   Gastrointestinal:  Negative for abdominal distention, abdominal pain, anal bleeding, blood in stool, change in bowel habit, constipation, diarrhea, nausea and vomiting.   Endocrine: Negative for cold intolerance, heat intolerance, polydipsia, polyphagia and polyuria.   Genitourinary:  Negative for bladder incontinence, decreased urine volume, difficulty urinating, dysuria, enuresis, flank pain, frequency, hematuria, nocturia, pelvic pain and urgency.   Musculoskeletal:  Negative for arthralgias, back pain, gait problem, joint swelling, leg pain, myalgias, neck pain, neck  stiffness and joint deformity.   Integumentary:  Negative for pallor, rash, wound, breast mass and breast tenderness.   Allergic/Immunologic: Negative for environmental allergies, food allergies and immunocompromised state.   Neurological:  Positive for headaches. Negative for dizziness, vertigo, tremors, seizures, syncope, facial asymmetry, speech difficulty, weakness, light-headedness, numbness, memory loss and coordination difficulties.   Hematological:  Negative for adenopathy. Does not bruise/bleed easily.   Psychiatric/Behavioral:  Negative for agitation, behavioral problems, confusion, decreased concentration, dysphoric mood, hallucinations, self-injury, sleep disturbance and suicidal ideas. The patient is not nervous/anxious and is not hyperactive.    Breast: Negative for mass and tenderness        Objective:      Physical Exam  Vitals reviewed.   Constitutional:       Appearance: Normal appearance.   HENT:      Head: Normocephalic and atraumatic.      Right Ear: Tympanic membrane, ear canal and external ear normal.      Left Ear: Tympanic membrane, ear canal and external ear normal.      Nose: Congestion and rhinorrhea present.      Mouth/Throat:      Mouth: Mucous membranes are moist.      Pharynx: Oropharynx is clear. Posterior oropharyngeal erythema present.   Eyes:      Extraocular Movements: Extraocular movements intact.      Conjunctiva/sclera: Conjunctivae normal.      Pupils: Pupils are equal, round, and reactive to light.   Cardiovascular:      Rate and Rhythm: Normal rate and regular rhythm.      Pulses: Normal pulses.      Heart sounds: Normal heart sounds.   Pulmonary:      Effort: Pulmonary effort is normal.      Breath sounds: Rhonchi present.   Abdominal:      General: Bowel sounds are normal.      Palpations: Abdomen is soft.   Musculoskeletal:         General: Normal range of motion.      Cervical back: Normal range of motion and neck supple.   Skin:     General: Skin is warm and dry.    Neurological:      General: No focal deficit present.      Mental Status: She is alert. Mental status is at baseline.   Psychiatric:         Mood and Affect: Mood normal.         Behavior: Behavior normal.         Thought Content: Thought content normal.         Judgment: Judgment normal.         Assessment:       1. Sinusitis, unspecified chronicity, unspecified location    2. Congested nose        Plan:     Sinusitis, unspecified chronicity, unspecified location  -     dexAMETHasone injection 4 mg  -     predniSONE (DELTASONE) 20 MG tablet; Take 1 tablet (20 mg total) by mouth once daily. for 5 days  Dispense: 5 tablet; Refill: 0  -     amoxicillin-clavulanate 875-125mg (AUGMENTIN) 875-125 mg per tablet; Take 1 tablet by mouth 2 (two) times a day. for 7 days  Dispense: 14 tablet; Refill: 0    Congested nose  -     POCT COVID-19 Rapid Screening  -     POCT Influenza A/B Molecular

## 2025-03-25 ENCOUNTER — OFFICE VISIT (OUTPATIENT)
Dept: OBSTETRICS AND GYNECOLOGY | Facility: CLINIC | Age: 45
End: 2025-03-25
Payer: COMMERCIAL

## 2025-03-25 VITALS
SYSTOLIC BLOOD PRESSURE: 138 MMHG | HEART RATE: 96 BPM | BODY MASS INDEX: 31.88 KG/M2 | WEIGHT: 191.63 LBS | DIASTOLIC BLOOD PRESSURE: 96 MMHG

## 2025-03-25 DIAGNOSIS — Z01.419 ROUTINE GYNECOLOGICAL EXAMINATION: Primary | ICD-10-CM

## 2025-03-25 DIAGNOSIS — Z12.4 CERVICAL CANCER SCREENING: ICD-10-CM

## 2025-03-25 DIAGNOSIS — Z12.39 ENCOUNTER FOR SCREENING FOR MALIGNANT NEOPLASM OF BREAST, UNSPECIFIED SCREENING MODALITY: ICD-10-CM

## 2025-03-25 DIAGNOSIS — Z11.4 ENCOUNTER FOR SCREENING FOR HIV: ICD-10-CM

## 2025-03-25 DIAGNOSIS — Z30.011 ENCOUNTER FOR INITIAL PRESCRIPTION OF CONTRACEPTIVE PILLS: ICD-10-CM

## 2025-03-25 DIAGNOSIS — Z72.51 HIGH RISK SEXUAL BEHAVIOR, UNSPECIFIED TYPE: ICD-10-CM

## 2025-03-25 DIAGNOSIS — Z11.3 SCREEN FOR STD (SEXUALLY TRANSMITTED DISEASE): ICD-10-CM

## 2025-03-25 PROCEDURE — 3075F SYST BP GE 130 - 139MM HG: CPT | Mod: ,,, | Performed by: OBSTETRICS & GYNECOLOGY

## 2025-03-25 PROCEDURE — 88142 CYTOPATH C/V THIN LAYER: CPT | Mod: TC,GCY | Performed by: OBSTETRICS & GYNECOLOGY

## 2025-03-25 PROCEDURE — 3008F BODY MASS INDEX DOCD: CPT | Mod: ,,, | Performed by: OBSTETRICS & GYNECOLOGY

## 2025-03-25 PROCEDURE — 99396 PREV VISIT EST AGE 40-64: CPT | Mod: ,,, | Performed by: OBSTETRICS & GYNECOLOGY

## 2025-03-25 PROCEDURE — 3080F DIAST BP >= 90 MM HG: CPT | Mod: ,,, | Performed by: OBSTETRICS & GYNECOLOGY

## 2025-03-25 PROCEDURE — 1159F MED LIST DOCD IN RCRD: CPT | Mod: ,,, | Performed by: OBSTETRICS & GYNECOLOGY

## 2025-03-25 RX ORDER — DROSPIRENONE 4 MG/1
1 TABLET, FILM COATED ORAL
COMMUNITY
Start: 2025-03-12 | End: 2025-03-25 | Stop reason: SDUPTHER

## 2025-03-25 RX ORDER — DROSPIRENONE 4 MG/1
1 TABLET, FILM COATED ORAL DAILY
Qty: 30 TABLET | Refills: 11 | Status: SHIPPED | OUTPATIENT
Start: 2025-03-25 | End: 2026-03-25

## 2025-03-25 NOTE — PROGRESS NOTES
CC: Well woman exam    Jw Martins is a 44 y.o. female  presents for well woman exam.    LMP: Patient's last menstrual period was 01/15/2025 (approximate)..    Last mammogram: 3-15-22  Last Colonoscopy: n/a    Denies any issues, problems, or complaints.    Past Medical History:   Diagnosis Date    Hypertension      Past Surgical History:   Procedure Laterality Date    DILATION AND CURETTAGE OF UTERUS      Surgery done in [place]    REDUCTION OF BOTH BREASTS       Social History[1]  Family History   Problem Relation Name Age of Onset    Hypertension Mother      Hypertension Father      Diabetes Father      Hypertension Maternal Grandmother      Hypertension Maternal Grandfather      Diabetes Maternal Grandfather      Hypertension Paternal Grandmother      Hypertension Paternal Grandfather       OB History          3    Para   1    Term   1            AB   1    Living   1         SAB   1    IAB        Ectopic        Multiple        Live Births   1                 BP (!) 138/96   Pulse 96   Wt 86.9 kg (191 lb 9.6 oz)   LMP 01/15/2025 (Approximate)   BMI 31.88 kg/m²       ROS:  GENERAL: Denies weight gain or weight loss. Feeling well overall.   SKIN: Denies rash or lesions.   HEAD: Denies head injury or headache.   NODES: Denies enlarged lymph nodes.   CHEST: Denies chest pain or shortness of breath.   CARDIOVASCULAR: Denies palpitations or left sided chest pain.   ABDOMEN: No abdominal pain, constipation, diarrhea, nausea, vomiting or rectal bleeding.   URINARY: No frequency, dysuria, hematuria, or burning on urination.  REPRODUCTIVE: See HPI.   BREASTS: The patient performs breast self-examination and denies pain, lumps, or nipple discharge.   HEMATOLOGIC: No easy bruisability or excessive bleeding.   MUSCULOSKELETAL: Denies joint pain or swelling.   NEUROLOGIC: Denies syncope or weakness.   PSYCHIATRIC: Denies depression, anxiety or mood swings.    PHYSICAL EXAM:  APPEARANCE: Well  nourished, well developed, in no acute distress.  AFFECT: WNL, alert and oriented x 3  SKIN: No acne or hirsutism  NECK: Neck symmetric without masses or thyromegaly  NODES: No inguinal, cervical, axillary, or femoral lymph node enlargement  CHEST: Good respiratory effect  ABDOMEN: Soft.  No tenderness or masses.  No hepatosplenomegaly.  No hernias.  BREASTS: Symmetrical, no skin changes or visible lesions.  No palpable masses, nipple discharge bilaterally.  PELVIC: Normal external genitalia without lesions.  Normal hair distribution.  Adequate perineal body, normal urethral meatus.  Vagina moist and well rugated without lesions or discharge.  Cervix pink, without lesions, discharge or tenderness.  No significant cystocele or rectocele.  Bimanual exam shows uterus to be normal size, regular, mobile and nontender.  Adnexa without masses or tenderness.    EXTREMITIES: No edema.    Routine gynecological examination  -     ThinPrep Pap Test; Future; Expected date: 03/25/2025    Cervical cancer screening  -     ThinPrep Pap Test; Future; Expected date: 03/25/2025    Screen for STD (sexually transmitted disease)    High risk sexual behavior, unspecified type    Encounter for screening for HIV    Encounter for initial prescription of contraceptive pills  -     SLYND 4 mg (28) Tab; Take 1 tablet (4 mg total) by mouth once daily.  Dispense: 30 tablet; Refill: 11    Encounter for screening for malignant neoplasm of breast, unspecified screening modality  -     Mammo Digital Screening Bilat w/ Gustavo (XPD); Future; Expected date: 03/25/2025            Patient was counseled today on A.C.S. Pap guidelines and recommendations for yearly pelvic exams, mammograms and monthly self breast exams; to see her PCP for other health maintenance.   Exercise regimen encouraged  Healthy food choices encouraged  Questions answered to desired level of satisfaction  Verbalized understanding to all information and instructions    Follow up in about  1 year (around 3/25/2026) for annual.      Charlie Arenas M.D., OG    OB/GYN                       [1]   Social History  Socioeconomic History    Marital status:    Tobacco Use    Smoking status: Never     Passive exposure: Never    Smokeless tobacco: Never   Substance and Sexual Activity    Drug use: Never
